# Patient Record
Sex: FEMALE | Race: WHITE | Employment: FULL TIME | URBAN - METROPOLITAN AREA
[De-identification: names, ages, dates, MRNs, and addresses within clinical notes are randomized per-mention and may not be internally consistent; named-entity substitution may affect disease eponyms.]

---

## 2017-02-07 ENCOUNTER — HOSPITAL ENCOUNTER (OUTPATIENT)
Dept: SLEEP CENTER | Facility: HOSPITAL | Age: 62
Discharge: HOME | End: 2017-02-07
Attending: INTERNAL MEDICINE
Payer: COMMERCIAL

## 2017-02-07 DIAGNOSIS — Z99.89 OSA ON CPAP: ICD-10-CM

## 2017-02-07 DIAGNOSIS — G47.33 OSA ON CPAP: ICD-10-CM

## 2017-04-17 ENCOUNTER — TRANSCRIBE ORDERS (OUTPATIENT)
Dept: SLEEP CENTER | Facility: CLINIC | Age: 62
End: 2017-04-17

## 2017-04-17 DIAGNOSIS — G47.33 OSA (OBSTRUCTIVE SLEEP APNEA): Primary | ICD-10-CM

## 2017-05-30 ENCOUNTER — TRANSCRIBE ORDERS (OUTPATIENT)
Dept: SLEEP CENTER | Facility: HOSPITAL | Age: 62
End: 2017-05-30

## 2017-05-30 ENCOUNTER — HOSPITAL ENCOUNTER (OUTPATIENT)
Dept: SLEEP CENTER | Facility: HOSPITAL | Age: 62
Discharge: HOME | End: 2017-05-30
Attending: INTERNAL MEDICINE
Payer: COMMERCIAL

## 2017-05-30 DIAGNOSIS — G47.33 OSA (OBSTRUCTIVE SLEEP APNEA): Primary | ICD-10-CM

## 2017-05-30 DIAGNOSIS — G47.33 OSA (OBSTRUCTIVE SLEEP APNEA): ICD-10-CM

## 2017-07-03 ENCOUNTER — TRANSCRIBE ORDERS (OUTPATIENT)
Dept: SLEEP CENTER | Facility: CLINIC | Age: 62
End: 2017-07-03

## 2017-07-03 DIAGNOSIS — G47.33 OSA (OBSTRUCTIVE SLEEP APNEA): Primary | ICD-10-CM

## 2017-10-03 ENCOUNTER — TRANSCRIBE ORDERS (OUTPATIENT)
Dept: SLEEP CENTER | Facility: HOSPITAL | Age: 62
End: 2017-10-03

## 2017-10-03 ENCOUNTER — HOSPITAL ENCOUNTER (OUTPATIENT)
Dept: SLEEP CENTER | Facility: HOSPITAL | Age: 62
Discharge: HOME | End: 2017-10-03
Attending: INTERNAL MEDICINE
Payer: COMMERCIAL

## 2017-10-03 DIAGNOSIS — G47.33 OSA (OBSTRUCTIVE SLEEP APNEA): Primary | ICD-10-CM

## 2017-10-03 DIAGNOSIS — G47.33 OSA (OBSTRUCTIVE SLEEP APNEA): ICD-10-CM

## 2018-01-16 ENCOUNTER — HOSPITAL ENCOUNTER (OUTPATIENT)
Dept: SLEEP CENTER | Facility: HOSPITAL | Age: 63
Discharge: HOME | End: 2018-01-16
Attending: INTERNAL MEDICINE
Payer: COMMERCIAL

## 2018-01-16 DIAGNOSIS — G47.33 OSA (OBSTRUCTIVE SLEEP APNEA): ICD-10-CM

## 2018-01-16 NOTE — PROGRESS NOTES
Follow-Up Note - Sleep Center   Mariela Chester  61 y o  female  :1955  [id number]    CC: I saw this patient for follow-up in clinic today for her Sleep Disordered Breathing, Coexisting Sleep and Medical Problems  Past medical, Family, Social History, ROS and medications were reviewed  She had recent hospitalization for pulmonary embolism to both her lungs  Herewith my findings in summary  Full Details are available on request      HPI:  With respect to  positive airway pressure therapy  - Compliance data shows:   she is using PAP more than 4 hours hours per night 100%  of the time  and AHI is 4 7/hour at 90th percentile pressure of 19 cm H2O     - She reports:is tolerating PAP   - :is not experiencing difficulties with use; she has minor problem with dry mouth  - :is benefiting from use;          Sleep Routine:  Juvencio Menchaca reports getting 9 hours sleep; she is not  having difficulty initiating or maintainingsleep    She awakens feeling unrefreshed  She admits to much improved excessive drowsiness and She rated herself at 5 /24 on the [place] sleepiness scale  She reports ongoing shortness of breath and chest tightness but states oxygen saturations were apparently normal with use of CPAP during sleep when she was hospitalized  On Exam:  Apart from slight weight increase to 266 lb, Physical findings are essentially unchanged from previous  Impression :   1  Moderate Obstructive Sleep Apnea  2  Hypersomnolence - improved  3  Morbid obesity  4  Shortness of breath  5  Dry mouth  6  Comorbidities:  Hypertension, diabetes, hypothyroidism and pulmonary embolism    Plan:  1  Treatment with  PAP is medically necessary and Juvencio Menchaca is agreable to continue use  She continues to decline a retitration study  2  Pressure setting: [Auto titrating Pap at 16-20 cm H2O     3  Instruction on care of equipment and strategies to improve comfort with use of PAP were discussed   A prescription to replace supplies as needed was provided and care coordinated with the DME provider  4  Need for compliance with therapy and weight reduction were emphasized  5  Follow-up is advised in [1 year or sooner if needed to monitor progress and to adjust therapy  Thank you for allowing me to participate in the care of this patient      Sincerely,    Yeni Jimenez MD  Board Certified Specialist

## 2019-01-07 ENCOUNTER — TRANSCRIBE ORDERS (OUTPATIENT)
Dept: SLEEP CENTER | Facility: HOSPITAL | Age: 64
End: 2019-01-07

## 2019-01-07 DIAGNOSIS — G47.33 OBSTRUCTIVE SLEEP APNEA: Primary | ICD-10-CM

## 2019-01-08 ENCOUNTER — OFFICE VISIT (OUTPATIENT)
Dept: SLEEP CENTER | Facility: HOSPITAL | Age: 64
End: 2019-01-08
Payer: COMMERCIAL

## 2019-01-08 VITALS
DIASTOLIC BLOOD PRESSURE: 74 MMHG | WEIGHT: 254 LBS | HEIGHT: 62 IN | BODY MASS INDEX: 46.74 KG/M2 | HEART RATE: 74 BPM | SYSTOLIC BLOOD PRESSURE: 120 MMHG

## 2019-01-08 DIAGNOSIS — I10 ESSENTIAL HYPERTENSION: ICD-10-CM

## 2019-01-08 DIAGNOSIS — E11.69 DIABETES MELLITUS TYPE 2 IN OBESE (HCC): ICD-10-CM

## 2019-01-08 DIAGNOSIS — G47.33 OBSTRUCTIVE SLEEP APNEA: Primary | ICD-10-CM

## 2019-01-08 DIAGNOSIS — R68.2 DRY MOUTH: ICD-10-CM

## 2019-01-08 DIAGNOSIS — E66.01 MORBID OBESITY WITH BMI OF 40.0-44.9, ADULT (HCC): ICD-10-CM

## 2019-01-08 DIAGNOSIS — E66.9 DIABETES MELLITUS TYPE 2 IN OBESE (HCC): ICD-10-CM

## 2019-01-08 DIAGNOSIS — IMO0002 SLEEP-RELATED HYPOVENTILATION: ICD-10-CM

## 2019-01-08 PROCEDURE — 99214 OFFICE O/P EST MOD 30 MIN: CPT | Performed by: INTERNAL MEDICINE

## 2019-01-08 RX ORDER — LEVOTHYROXINE SODIUM 137 UG/1
137 TABLET ORAL DAILY
COMMUNITY
Start: 2018-11-06

## 2019-01-08 RX ORDER — APIXABAN 5 MG/1
2.5 TABLET, FILM COATED ORAL 2 TIMES DAILY
Refills: 3 | COMMUNITY
Start: 2018-12-28

## 2019-01-08 RX ORDER — LISINOPRIL 10 MG/1
10 TABLET ORAL DAILY
COMMUNITY
Start: 2018-11-06

## 2019-01-08 RX ORDER — SIMVASTATIN 10 MG
10 TABLET ORAL
COMMUNITY
Start: 2018-11-06

## 2019-01-08 NOTE — PROGRESS NOTES
Review of Systems      Genitourinary none   Cardiology none   Gastrointestinal none   Neurology balance problems   Constitutional none   Integumentary none   Psychiatry none   Musculoskeletal none   Pulmonary shortness of breath with activity   ENT none   Endocrine none   Hematological none

## 2019-01-08 NOTE — PROGRESS NOTES
Follow-Up Note - Sleep Center   Juma Stephenson  59 y o  female  :1955  [id number]    CC: I saw this patient for follow-up in clinic today for her Sleep Disordered Breathing, Coexisting Sleep and Medical Problems  She got a mandibular advancement device and is asking if she can discontinue CPAP  PFSH, Problem List, Medications & Allergies were reviewed in EMR  Interval changes: none reported  She  has no past medical history on file  She has a current medication list which includes the following prescription(s): eliquis, levothyroxine, lisinopril, metformin, and simvastatin  ROS: Reviewed (see attached)  She has had approximately 5-10 lb intentional weight loss  Medical conditions are stable  DATA REVIEWED: compliance data download shows  using PAP > 4 hours/night 97% of the time  JEAN CLAUDE (estimated) 3 7/hour at 90th percentile pressure of 19cm H2O  These figures are a bland of dental appliance alone, CPAP alone or at times a combination  SUBJECTIVE: Rosmery Toney reports no  difficulty tolerating PAP;  · She is experiencing some adverse effects: dry mouth and mask causes redness / facial marks   · She is benefitting from use and reports: sleeping better   Sleep Routine: She reports getting 7-8 hours sleep  ; she has no difficulty initiating or maintaining sleep   She awakens spontaneously feeling refreshed  She denied excessive drowsiness   She rated herself at Total score: 2 /24 on the [place] sleepiness scale  Habits: reports that she has quit smoking  She has never used smokeless tobacco ,  reports that she drinks alcohol ,  reports that she does not use drugs  , Caffeine use: limited , Exercise routine: irregular   OBJECTIVE: /74   Pulse 74   Ht 5' 2" (1 575 m)   Wt 115 kg (254 lb)   BMI 46 46 kg/m²    Patient is well groomed; well appearing  Skin/Extrem: warm & dry; col & hydration normal; no edema  Psych: cooperativeand in no distress   Mental state appears normal   CNS: Alert, orientated, clear & coherent speech  H&N: EOMI; NC/AT:no facial pressure marks, no rashes  Neck Circumference: 43 cm  ENMT Mucus membranes normal Nasal airway:patent  Oral airway: crowded  Resp:effort is normal CVS: RRR ABD:truncal obesity MSK:Gait normal     ASSESSMENT: Primary Sleep/Secondary(to Medical or Psych conditions) & comorbidities   1  Obstructive sleep apnea  Ambulatory referral to Sleep Medicine   2  Sleep-related hypoventilation     3  Dry mouth     4  Morbid obesity with BMI of 40 0-44 9, adult ([facility] )     5  Essential hypertension     6  Diabetes mellitus type 2 in obese ([facility] )         PLAN:  1  I reviewed results of the Sleep studies with the patient:  Her diagnostic study in 2010 demonstrated an overall AHI of 11 2 per hour, considerably higher during REM at 38 per hour  Minimum oxygen saturation of 65% and 25 6% of time asleep spent with saturations less than 90%  2  Treatment with  PAP is medically necessary and Gracia Mills is agreable to continue use  3  Care of equipment, methods to improve comfort using PAP and importance of compliance with therapy were discussed  4  Her goal is to get off CPAP and she wanted the pressure to be reduced  Pressure setting:  Change 16-18 cm H2O     5  Rx provided to replace supplies and Care coordinated with DME provider  6  Strategies for weight reduction were discussed  7  She may continue with adjustment of the mandibular advancement device to the point she  Snoring and symptoms are controlled  A repeat diagnostic study to be undertaken at this point to check therapeutic efficacy and to read titrate positive airway pressure therapy as an adjunct if necessary  8  Follow-up is advised in 1 year or sooner if needed to monitor progress, compliance and to adjust therapy  Thank you for allowing me to participate in the care of this patient      Sincerely,    Authenticated electronically by Yonatan Shah MD on 01/08/19   Board Certified Specialist

## 2019-03-11 ENCOUNTER — HOSPITAL ENCOUNTER (OUTPATIENT)
Dept: SLEEP CENTER | Facility: HOSPITAL | Age: 64
Discharge: HOME | End: 2019-03-11
Attending: INTERNAL MEDICINE
Payer: COMMERCIAL

## 2019-03-11 DIAGNOSIS — G47.33 OBSTRUCTIVE SLEEP APNEA: ICD-10-CM

## 2019-03-11 DIAGNOSIS — IMO0002 SLEEP-RELATED HYPOVENTILATION: ICD-10-CM

## 2019-03-11 PROCEDURE — 95811 POLYSOM 6/>YRS CPAP 4/> PARM: CPT

## 2019-03-12 DIAGNOSIS — G47.33 OSA (OBSTRUCTIVE SLEEP APNEA): Primary | ICD-10-CM

## 2019-03-12 NOTE — PROGRESS NOTES
Sleep Study Documentation    Pre-Sleep Study       Sleep testing procedure explained to patient:YES    Patient napped prior to study:NO    Caffeine:Dayshift worker after 12PM   Caffeine use:NO    Alcohol:Dayshift workers after 5PM: Alcohol use:NO    Typical day for patient:YES       Study Documentation    Sleep Study Indications: G47 [id number]    Split Optimal PAP pressure: 6  Leak:Small  Snore:Eliminated  REM Obtained:yes  Supplemental O2: no    Minimum SaO2 82  Baseline SaO2  94  PAP mask tried (list all) Resmed Airfit F10  PAP mask choice (final) Resmed Airfit F10  PAP mask type:full face  PAP pressure at which snoring was eliminated 4  Minimum SaO2 at final PAP pressure  90  Mode of Therapy:CPAP  ETCO2:No  CPAP changed to BiPAP:No    Mode of Therapy:    EKG abnormalities: no     EEG abnormalities: no    Sleep Study Recorded < 2 hours: N/A    Sleep Study Recorded > 2 hours but incomplete study: N/A    Sleep Study Recorded 6 hours but no sleep obtained: NO    Patient classification: retired       Post-Sleep Study    Medication used at bedtime or during sleep study:YES other prescription medications    Patient reports time it took to fall asleep:30 to 60 minutes    Patient reports waking up during study:3 or more times  Patient reports returning to sleep in 10 to 30 minutes  Patient reports sleeping 6 to 8 hours with dreaming  Patient reports sleep during study:worse than usual    Patient rated sleepiness: Not sleepy or tired    PAP treatment:yes: Post PAP treatment patient reports feeling unchanged and would wear PAP mask at home

## 2019-03-13 ENCOUNTER — TELEPHONE (OUTPATIENT)
Dept: SLEEP CENTER | Facility: CLINIC | Age: 64
End: 2019-03-13

## 2019-03-13 NOTE — TELEPHONE ENCOUNTER
Spoke with patient regarding decrease in pressure to 7-10 cm  Pressure will be changed remotely  Rx and data to [address]   Compliance follow up scheduled 5/21 @ 2:45 in [facility]

## 2019-05-17 ENCOUNTER — TRANSCRIBE ORDERS (OUTPATIENT)
Dept: SLEEP CENTER | Facility: HOSPITAL | Age: 64
End: 2019-05-17

## 2019-05-17 DIAGNOSIS — G47.33 OBSTRUCTIVE SLEEP APNEA: Primary | ICD-10-CM

## 2019-05-17 DIAGNOSIS — G47.36 SLEEP RELATED HYPOVENTILATION IN CONDITIONS CLASSIFIED ELSEWHERE: ICD-10-CM

## 2019-07-30 ENCOUNTER — OFFICE VISIT (OUTPATIENT)
Dept: SLEEP CENTER | Facility: HOSPITAL | Age: 64
End: 2019-07-30
Payer: COMMERCIAL

## 2019-07-30 VITALS
WEIGHT: 209 LBS | HEIGHT: 62 IN | DIASTOLIC BLOOD PRESSURE: 66 MMHG | BODY MASS INDEX: 38.46 KG/M2 | SYSTOLIC BLOOD PRESSURE: 114 MMHG | HEART RATE: 72 BPM

## 2019-07-30 DIAGNOSIS — G47.36 SLEEP RELATED HYPOVENTILATION IN CONDITIONS CLASSIFIED ELSEWHERE: ICD-10-CM

## 2019-07-30 DIAGNOSIS — E66.9 DIABETES MELLITUS TYPE 2 IN OBESE (HCC): ICD-10-CM

## 2019-07-30 DIAGNOSIS — E11.69 DIABETES MELLITUS TYPE 2 IN OBESE (HCC): ICD-10-CM

## 2019-07-30 DIAGNOSIS — E66.9 OBESITY (BMI 30-39.9): ICD-10-CM

## 2019-07-30 DIAGNOSIS — G47.33 OBSTRUCTIVE SLEEP APNEA: Primary | ICD-10-CM

## 2019-07-30 DIAGNOSIS — I10 ESSENTIAL HYPERTENSION: ICD-10-CM

## 2019-07-30 DIAGNOSIS — R68.2 DRY MOUTH: ICD-10-CM

## 2019-07-30 PROCEDURE — 99214 OFFICE O/P EST MOD 30 MIN: CPT | Performed by: INTERNAL MEDICINE

## 2019-07-30 PROCEDURE — 3074F SYST BP LT 130 MM HG: CPT | Performed by: INTERNAL MEDICINE

## 2019-07-30 NOTE — PROGRESS NOTES
Follow-Up Note - Sleep Center   Padmini Mayberry  59 y o  female  :1955  [id number]    CC: I saw this patient for follow-up in clinic today for her Sleep Disordered Breathing, Coexisting Sleep and Medical Problems  Patient had a split sleep study and is here to review results and to adjust therapy  The diagnostic portion (undertaken with just the mandibular advancement device) demonstrated: AHI of 6 5 per hour, considerably  higher during stage REM  Intermittent snoring of moderate intensity was noted  Minimum oxygen saturation was 82 % 11 4% of time asleep was spent with saturations less than 90%         During the therapeutic portion of the study (with Pap added to the dental appliance), sleep disordered breathing was partially remediated with nasal CPAP at 7 cm H2O  The AHI at this setting was 7 5 per hour however data is likely skewed because she was observed only 16 minutes while asleep at this setting  Minimum oxygen saturation was maintained above 90%  She was observed during stage REM and while supine  Sleep efficiency improved from 59-79%  PFSH, Problem List, Medications & Allergies were reviewed in EMR  Interval changes: none reported  She  has no past medical history on file  She has a current medication list which includes the following prescription(s): eliquis, levothyroxine, lisinopril, metformin, and simvastatin  ROS: Reviewed (see attached)  She reports intentional weight loss of around 40 lb since her last visit  DATA REVIEWED:  using PAP > 4 hours/night 100% of the time  Estimated JEAN CLAUDE 4 7/hour at pressure of 9 8cm H2O @90th percentile  SUBJECTIVE: Regarding use of PAP, Bettye Peralta reports:   · She is experiencing some adverse effects: dry mouth and mask causes redness / facial marks   · She is   benefiting from use: sleeping better   Sleep Routine: She reports getting 6-7 hrs sleep  ; she has no difficulty initiating or maintaining sleep      She awakens spontaneously and feels refreshed  She denied excessive drowsiness   She rated herself at Total score: 3 /24 on the [place] sleepiness scale  Habits: reports that she has quit smoking  She has never used smokeless tobacco ,  reports that she drinks alcohol ,  reports that she does not use drugs  , Caffeine use: limited , Exercise routine: regular    OBJECTIVE: /66   Pulse 72   Ht 5' 2" (1 575 m)   Wt 94 8 kg (209 lb)   BMI 38 23 kg/m²    Constitutional: Patient is well groomed; well appearing  Skin/Extrem: warm & dry; col & hydration normal; no edema  Psych: cooperativeand in no distress  Normal mood, affect & thought   CNS: Alert, orientated, clear & coherent speech  H&N: EOMI; NC/AT:no facial pressure marks, no rashes  ENMT Mucus membranes normal Nasal airway:patent  Oral airway: crowded  Resp:effort is normal CVS: RRR ABD:truncal obesity MSK:Gait normal     ASSESSMENT: Primary Sleep/Secondary(to Medical or Psych conditions) & comorbidities   1  Obstructive sleep apnea  Ambulatory referral to Sleep Medicine    PAP DME Resupply/Reorder   2  Sleep related hypoventilation in conditions classified elsewhere  Ambulatory referral to Sleep Medicine   3  Dry mouth     4  Obesity (BMI 30-39 9)     5  Essential hypertension     6  Diabetes mellitus type 2 in obese ([facility] [address] )       PLAN:  1  Treatment with  PAP is medically necessary and Franny Velarde is agreable to continue use  2  Care of equipment, methods to improve comfort using PAP and importance of compliance with therapy were discussed  3  Pressure setting: continue 7-10 cmH2O together with mandibular advancement device  4  Rx provided to replace supplies and Care coordinated with DME provider  5  Strategies for weight reduction were discussed  6  Follow-up is advised in 1 year or sooner if needed to monitor progress, compliance and to adjust therapy        Thank you for allowing me to participate in the care of this patient      Sincerely,    Authenticated electronically by Chapin Doyle MD on 10/10/71   Board Certified Specialist

## 2019-07-30 NOTE — PROGRESS NOTES
Review of Systems      Genitourinary none   Cardiology none   Gastrointestinal none   Neurology need to move extremities and balance problems   Constitutional weight change   Integumentary none   Psychiatry none   Musculoskeletal none   Pulmonary snoring   ENT none   Endocrine none   Hematological none

## 2020-02-27 ENCOUNTER — TELEPHONE (OUTPATIENT)
Dept: SLEEP CENTER | Facility: CLINIC | Age: 65
End: 2020-02-27

## 2020-02-27 NOTE — TELEPHONE ENCOUNTER
Patient left message that [employer] is requesting office notes be faxed so that patient can receive resupplies   Office notes from 7/30/19 visit faxed to [facility]

## 2020-03-02 NOTE — TELEPHONE ENCOUNTER
Patient called and reports that she can't get supplies from [employer]  Chart notes were faxed to [employer] on 2/27/2020  Carolyn Whitmore from [facility] reports that ce has clinical notes   He will have resupply contact her today     Returned the patient call advised her that someone from [employer] would reach out today for supplies

## 2020-03-17 ENCOUNTER — OFFICE VISIT (OUTPATIENT)
Dept: SLEEP CENTER | Facility: HOSPITAL | Age: 65
End: 2020-03-17
Payer: MEDICARE

## 2020-03-17 VITALS
SYSTOLIC BLOOD PRESSURE: 110 MMHG | HEIGHT: 62 IN | WEIGHT: 222 LBS | HEART RATE: 77 BPM | BODY MASS INDEX: 40.85 KG/M2 | DIASTOLIC BLOOD PRESSURE: 68 MMHG

## 2020-03-17 DIAGNOSIS — E66.01 MORBID OBESITY WITH BMI OF 40.0-44.9, ADULT (HCC): ICD-10-CM

## 2020-03-17 DIAGNOSIS — E11.69 DIABETES MELLITUS TYPE 2 IN OBESE (HCC): ICD-10-CM

## 2020-03-17 DIAGNOSIS — G47.36 SLEEP RELATED HYPOVENTILATION IN CONDITIONS CLASSIFIED ELSEWHERE: ICD-10-CM

## 2020-03-17 DIAGNOSIS — E66.9 OBESITY (BMI 30-39.9): ICD-10-CM

## 2020-03-17 DIAGNOSIS — G47.33 OBSTRUCTIVE SLEEP APNEA: Primary | ICD-10-CM

## 2020-03-17 DIAGNOSIS — E66.9 DIABETES MELLITUS TYPE 2 IN OBESE (HCC): ICD-10-CM

## 2020-03-17 DIAGNOSIS — I10 ESSENTIAL HYPERTENSION: ICD-10-CM

## 2020-03-17 PROCEDURE — 99214 OFFICE O/P EST MOD 30 MIN: CPT | Performed by: INTERNAL MEDICINE

## 2020-03-17 NOTE — PROGRESS NOTES
Review of Systems      Genitourinary none   Cardiology none   Gastrointestinal none   Neurology numbness/tingling of an extremity and balance problems   Constitutional none   Integumentary none   Psychiatry none   Musculoskeletal none   Pulmonary snoring   ENT throat clearing   Endocrine none   Hematological none

## 2020-03-17 NOTE — PROGRESS NOTES
Follow-Up Note - Sleep Center   Adin Olguin  72 y o  female  :1955  [id number]:[id number]    CC: I saw this patient for follow-up in clinic today for her Sleep Disordered Breathing, Coexisting Sleep and Medical Problems  She comes in before her schedule this it because recently switched to Medicare PFSH, Problem List, Medications & Allergies were reviewed in EMR  Interval changes: none reported  She  has no past medical history on file  She has a current medication list which includes the following prescription(s): eliquis, levothyroxine, lisinopril, metformin, and simvastatin  ROS: A 10 point review of systems undertaken (as attached)  Significant for approximately 20 lb weight gain  DATA REVIEWED:  using PAP > 4 hours/night 93% of the time  Estimated JEAN CLAUDE >5/hour episodic at pressure of 10cm H2O @90th percentile  SUBJECTIVE: Regarding use of PAP, Guerline Cuellar reports:   · She is experiencing some adverse effects: mask causes redness / facial marks   · She is   benefiting from use: sleeping better   Sleep Routine: She reports getting 8-9 hrs sleep  ; she has no difficulty initiating or maintaining sleep   She awakens spontaneously and feels refreshed  She denied excessive drowsiness   She rated herself at Total score: 4 /24 on the [place] sleepiness scale  Habits: reports that she quit smoking about 22 years ago  She has quit using smokeless tobacco ,  reports that she drinks alcohol ,  reports that she does not use drugs  , Caffeine use: limited , Exercise routine: regular    OBJECTIVE: /68   Pulse 77   Ht 5' 2" (1 575 m)   Wt 101 kg (222 lb)   BMI 40 60 kg/m²    Constitutional: Patient is well groomed; well appearing  Skin/Extrem: warm & dry; col & hydration normal; no edema  Psych: cooperativeand in no distress  Mental State appears normal   CNS: Alert, orientated, clear & coherent speech  H&N: EOMI; NC/AT:no facial pressure marks, no rashes     ENMT Mucus membranes normal Nasal airway:patent  Oral airway: crowded  Resp:effort is normal CVS: RRR ABD:truncal obesity MSK:Gait normal     ASSESSMENT: Primary Sleep/Secondary(to Medical or Psych conditions) & comorbidities   1  Obstructive sleep apnea  PAP DME Resupply/Reorder    PAP DME Pressure Change   2  Sleep related hypoventilation in conditions classified elsewhere     3  Obesity (BMI 30-39 9)     4  Essential hypertension     5  Diabetes mellitus type 2 in obese ([facility] [address] )       PLAN:  1  Treatment with  PAP is medically necessary and [facility] is agreable to continue use  2  Care of equipment, methods to improve comfort using PAP and importance of compliance with therapy were discussed  3  Pressure setting: change 9-12 cmH2O     4  Rx provided to replace supplies and Care coordinated with DME provider  5  Strategies for weight reduction were discussed  6  Follow-up is advised in 1 year or sooner if needed to monitor progress, compliance and to adjust therapy  Thank you for allowing me to participate in the care of this patient      Sincerely,    Authenticated electronically by Alma Rosa Naylor MD on 29/48/07   Board Certified Specialist

## 2020-03-17 NOTE — PATIENT INSTRUCTIONS

## 2020-03-18 ENCOUNTER — TELEPHONE (OUTPATIENT)
Dept: SLEEP CENTER | Facility: CLINIC | Age: 65
End: 2020-03-18

## 2020-03-23 ENCOUNTER — TELEPHONE (OUTPATIENT)
Dept: SLEEP CENTER | Facility: CLINIC | Age: 65
End: 2020-03-23

## 2021-01-15 ENCOUNTER — TELEPHONE (OUTPATIENT)
Dept: SLEEP CENTER | Facility: CLINIC | Age: 66
End: 2021-01-15

## 2021-01-15 NOTE — TELEPHONE ENCOUNTER
Patient called she is havint trouble sleeping and she is concerned about her fit bit reading   Changed her appointment to a sooner date

## 2021-02-01 NOTE — PROGRESS NOTES
Review of Systems      Genitourinary none   Cardiology none   Gastrointestinal none   Neurology none   Constitutional none   Integumentary none   Psychiatry none   Musculoskeletal back pain   Pulmonary wheezing and frequent cough   ENT throat clearing   Endocrine none   Hematological none

## 2021-02-02 ENCOUNTER — TELEMEDICINE (OUTPATIENT)
Dept: SLEEP CENTER | Facility: HOSPITAL | Age: 66
End: 2021-02-02
Payer: MEDICARE

## 2021-02-02 DIAGNOSIS — G47.33 OBSTRUCTIVE SLEEP APNEA: Primary | ICD-10-CM

## 2021-02-02 DIAGNOSIS — E66.01 MORBID OBESITY WITH BMI OF 40.0-44.9, ADULT (HCC): ICD-10-CM

## 2021-02-02 DIAGNOSIS — E66.9 DIABETES MELLITUS TYPE 2 IN OBESE (HCC): ICD-10-CM

## 2021-02-02 DIAGNOSIS — I10 ESSENTIAL HYPERTENSION: ICD-10-CM

## 2021-02-02 DIAGNOSIS — G47.36 SLEEP RELATED HYPOVENTILATION IN CONDITIONS CLASSIFIED ELSEWHERE: ICD-10-CM

## 2021-02-02 DIAGNOSIS — R68.2 DRY MOUTH: ICD-10-CM

## 2021-02-02 DIAGNOSIS — E11.69 DIABETES MELLITUS TYPE 2 IN OBESE (HCC): ICD-10-CM

## 2021-02-02 PROCEDURE — 99214 OFFICE O/P EST MOD 30 MIN: CPT | Performed by: INTERNAL MEDICINE

## 2021-02-02 NOTE — PROGRESS NOTES
Virtual Regular Visit      Assessment/Plan:    Problem List Items Addressed This Visit        Digestive    Dry mouth       Endocrine    Diabetes mellitus type 2 in obese (HCC)       Respiratory    Obstructive sleep apnea - Primary       Cardiovascular and Mediastinum    Essential hypertension       Other    Morbid obesity with BMI of 40 0-44 9, adult ([facility] [address] )      Other Visit Diagnoses     Sleep related hypoventilation in conditions classified elsewhere                 Reason for visit is   Chief Complaint   Patient presents with    Virtual Regular Visit        Encounter provider Alma Rosa Naylor MD    Provider located at [place] [address] [address] [place] [address]      Recent Visits  No visits were found meeting these conditions  Showing recent visits within past 7 days and meeting all other requirements     Today's Visits  Date Type Provider Dept   21 Roni Galvez MD  [facility] [place]   Showing today's visits and meeting all other requirements     Future Appointments  No visits were found meeting these conditions  Showing future appointments within next 150 days and meeting all other requirements        The patient was identified by name and date of birth  Emmie Salas was informed that this is a telemedicine visit and that the visit is being conducted through [facility] and patient was informed that this is a secure, HIPAA-compliant platform  She agrees to proceed     My office door was closed  No one else was in the room  She acknowledged consent and understanding of privacy and security of the video platform  The patient has agreed to participate and understands they can discontinue the visit at any time  Patient is aware this is a billable service  Follow-Up Note - Sleep Center   Emmie Salas  77 y o  female  :1955  LNP:[id number]    CC:  I saw this patient for follow-up in clinic today for Sleep disordered breathing, Coexisting Sleep and Medical Problems  She had a recent exacerbation of asthma for which she was started on inhalers  Her symptoms have improved but she is extremely concerned about how oxygen levels during sleep  She sent an image from her fit bit that I reviewed  Results of a split study in 2019:  The diagnostic portion (undertaken with just the mandibular advancement device) demonstrated: AHI of 6 5 per hour, considerably  higher during stage REM  Intermittent snoring of moderate intensity was noted  Minimum oxygen saturation was 82 % 11 4% of time asleep was spent with saturations less than 90%          During the therapeutic portion of the study (with Pap added to the dental appliance), sleep disordered breathing was partially remediated with nasal CPAP at 7 cm H2O  The AHI at this setting was 7 5 per hour however data is likely skewed because she was observed only 16 minutes while asleep at this setting  Minimum oxygen saturation was maintained above 90%  She was observed during stage REM and while supine  Sleep efficiency improved from 59-79%      PFSH, Problem List, Medications & Allergies were reviewed in EMR  Interval changes: none reported  She  has no past medical history on file  She has a current medication list which includes the following prescription(s): eliquis, levothyroxine, lisinopril, metformin, and simvastatin  ROS: as attached  Significant for some weight gain since her last visit  She reports no nasal symptoms but frequently needs to clear her throat  She continues to report some coughing and wheezing     PHYSIOLOGICAL DATA REVIEW AND INTERPRETATION:   using PAP > 4 hours/night 100% of the time  Estimated JEAN CLAUDE 2 7/hour at pressure of 11 6cm H2O @90th percentile     Compliance: excellent; Sleep disordered breathing:stable    SUBJECTIVE: Regarding use of PAP, Mica Mom reports:   · She is experiencing minor adverse effects: mask leaks   · She is benefiting from use: sleeping better   Sleep Routine: She reports getting 7-8 hrs sleep  ; she has no difficulty initiating or maintaining sleep   She awakens spontaneously and feels refreshed  Excessive Daytime drowsiness denied    She  rated herself at Total score: 3 /24 on the [place] sleepiness scale ]         Habits: reports that she quit smoking about 23 years ago  She has quit using smokeless tobacco ,  reports current alcohol use ,  reports no history of drug use , Caffeine use: limited , Exercise routine: irregular   EXAM: There were no vitals taken for this visit  Patient is well groomed; well appearing  Skin/Extrem: col & hydration normal; no edema  Psych: cooperativeand in no distress  Mental state:appears normal   CNS: Alert, orientated, clear & coherent speech  H&N: EOMI; NC/AT:no facial pressure marks, no rashes  Respiratory effort appears normal    IMPRESSION: Primary Sleep/Secondary(to Medical or Psych conditions) & comorbidities   1  Obstructive sleep apnea     2  Sleep related hypoventilation in conditions classified elsewhere     3  Dry mouth     4  Essential hypertension     5  Morbid obesity with BMI of 40 0-44 9, adult ([facility] )     6  Diabetes mellitus type 2 in obese ([facility] )         PLAN:  1  I reviewed results of prior studies and physiologic data with the patient  I discussed treatment options with risks and benefits  2  I informed her off limitations of wearable devices, particularly since they have not been validated  She is not experiencing any daytime symptoms that would suggest her sleep disordered breathing is unstable  Not withstanding, she remains concerned and wants "to be sure my oxygen is okay and I do not stops breathing during sleep " To be certain, the options would be nocturnal pulse oximetry, home sleep testing using peripheral arterial tonometry or a repeat in-lab titration study      3  Treatment with  PAP is medically necessary and Claudetta Ganong is agreable to continue use  4  Care of equipment, methods to improve comfort using PAP and importance of compliance with therapy were discussed  5  Pressure setting: continue 9-12 cmH2O     6  Rx provided to replace supplies and Care coordinated with DME provider  7  Discussed  strategies for weight reduction  8  She is under care for medical conditions and asthma  9  She wanted to undertake Home sleep testing using peripheral arterial tone especially since this study will monitor during sleep for 2-3 nights  10  Follow-up is advised after the study to review results and to  and to adjust therapy  Thank you for allowing me to participate in the care of this patient  Sincerely,    Authenticated electronically by Shell Gold MD on 61/01/02   Board Certified Specialist     I spent 20 minutes directly with the patient during this visit      VIRTUAL VISIT DISCLAIMER    Harshaalejandro Sabrina acknowledges that she has consented to an online visit or consultation  She understands that the online visit is based solely on information provided by her, and that, in the absence of a face-to-face physical evaluation by the physician, the diagnosis she receives is both limited and provisional in terms of accuracy and completeness  This is not intended to replace a full medical face-to-face evaluation by the physician  Jose F Bennett understands and accepts these terms

## 2021-02-03 ENCOUNTER — TELEPHONE (OUTPATIENT)
Dept: SLEEP CENTER | Facility: CLINIC | Age: 66
End: 2021-02-03

## 2021-02-15 ENCOUNTER — TELEPHONE (OUTPATIENT)
Dept: SLEEP CENTER | Facility: CLINIC | Age: 66
End: 2021-02-15

## 2021-02-15 NOTE — TELEPHONE ENCOUNTER
Patient left voicemail message that Dr Kathy Gaviria said he was going to order an overnight pulse oximetry test    She is asking if one is going to be ordered     If no she wants an explanation please

## 2021-02-26 NOTE — TELEPHONE ENCOUNTER
Per Dr Mayank Keith, pulse oximetry device is being mailed to patient through his private office  Called patient to confirm if she received this device  Patient states she did not receive yet but she called Dr Larissa Fragoso office [phone] and was told that it was mailed  Patient states she will wait a few more days and if she does not receive it she will call Dr Larissa Fragoso private office

## 2021-03-04 DIAGNOSIS — Z23 ENCOUNTER FOR IMMUNIZATION: ICD-10-CM

## 2021-03-09 ENCOUNTER — IMMUNIZATIONS (OUTPATIENT)
Dept: FAMILY MEDICINE CLINIC | Facility: HOSPITAL | Age: 66
End: 2021-03-09

## 2021-03-09 DIAGNOSIS — Z23 ENCOUNTER FOR IMMUNIZATION: Primary | ICD-10-CM

## 2021-03-09 PROCEDURE — 91300 SARS-COV-2 / COVID-19 MRNA VACCINE (PFIZER-BIONTECH) 30 MCG: CPT

## 2021-03-09 PROCEDURE — 0001A SARS-COV-2 / COVID-19 MRNA VACCINE (PFIZER-BIONTECH) 30 MCG: CPT

## 2021-03-29 ENCOUNTER — IMMUNIZATIONS (OUTPATIENT)
Dept: FAMILY MEDICINE CLINIC | Facility: HOSPITAL | Age: 66
End: 2021-03-29

## 2021-03-29 DIAGNOSIS — Z23 ENCOUNTER FOR IMMUNIZATION: Primary | ICD-10-CM

## 2021-03-29 PROCEDURE — 91300 SARS-COV-2 / COVID-19 MRNA VACCINE (PFIZER-BIONTECH) 30 MCG: CPT

## 2021-03-29 PROCEDURE — 0002A SARS-COV-2 / COVID-19 MRNA VACCINE (PFIZER-BIONTECH) 30 MCG: CPT

## 2021-06-24 ENCOUNTER — TELEPHONE (OUTPATIENT)
Dept: SLEEP CENTER | Facility: CLINIC | Age: 66
End: 2021-06-24

## 2021-06-24 NOTE — TELEPHONE ENCOUNTER
Patient called about PAP recall    I advised [facility] recommendations:    Continuous Positive Airway Pressure (CPAP) therapy was prescribed to you as a medical necessity and there are risks of discontinuing  use of the device, some of which may be long term  Symptoms you experienced before using CPAP may return such as snoring, apneas, excessive daytime sleepiness, hypertension, cardiac arrhythmias, risk of stroke, congestive heart-failure, exacerbation of COPD and potential respiratory failure  Ultimately, it is a personal decision for you to make if you continue use of an affected device or discontinue until a replacement is provided  Unfortunately, [employer] has not yet provided us with information about available devices  You can visit their website at www  [employer]/scr-update to register your device and to learn more about how [employer] plans to replace your device  Patient has been using old machine, but feels that her current machine is about 9years old, I advised she should reach out to [facility] to see if she is eligible and if she is call back and we can have Dr Remington Blankenship write new order  Patient agreeable

## 2021-06-28 ENCOUNTER — OFFICE VISIT (OUTPATIENT)
Dept: GASTROENTEROLOGY | Facility: CLINIC | Age: 66
End: 2021-06-28
Payer: MEDICARE

## 2021-06-28 ENCOUNTER — TELEPHONE (OUTPATIENT)
Dept: GASTROENTEROLOGY | Facility: CLINIC | Age: 66
End: 2021-06-28

## 2021-06-28 VITALS
WEIGHT: 242 LBS | SYSTOLIC BLOOD PRESSURE: 118 MMHG | HEIGHT: 62 IN | DIASTOLIC BLOOD PRESSURE: 76 MMHG | HEART RATE: 72 BPM | BODY MASS INDEX: 44.53 KG/M2

## 2021-06-28 DIAGNOSIS — I82.4Z9 ACUTE DEEP VEIN THROMBOSIS (DVT) OF DISTAL VEIN OF LOWER EXTREMITY, UNSPECIFIED LATERALITY (HCC): ICD-10-CM

## 2021-06-28 DIAGNOSIS — Z86.010 HISTORY OF COLON POLYPS: Primary | ICD-10-CM

## 2021-06-28 DIAGNOSIS — Z12.11 SCREENING FOR COLON CANCER: ICD-10-CM

## 2021-06-28 DIAGNOSIS — Z79.01 CURRENT USE OF LONG TERM ANTICOAGULATION: ICD-10-CM

## 2021-06-28 DIAGNOSIS — I26.99 OTHER ACUTE PULMONARY EMBOLISM WITHOUT ACUTE COR PULMONALE (HCC): ICD-10-CM

## 2021-06-28 PROBLEM — Z86.0100 HISTORY OF COLON POLYPS: Status: ACTIVE | Noted: 2021-06-28

## 2021-06-28 PROBLEM — R42 VERTIGO: Status: ACTIVE | Noted: 2021-06-28

## 2021-06-28 PROCEDURE — 99204 OFFICE O/P NEW MOD 45 MIN: CPT | Performed by: NURSE PRACTITIONER

## 2021-06-28 NOTE — H&P (VIEW-ONLY)
[facility] - Outpatient Consultation  Alana Rivero 77 y o  female MRN: [id number]  Encounter: [id number]    ASSESSMENT AND PLAN:      1  History of colon polyps  Remote history of colon polyps on colonoscopy  Last colonoscopy 07/2016 was normal, 5 year recall  No recent alarm symptoms  [facility]  -clear liquid diet and bowel prep reviewed with patient    2  Screening for colon cancer  Last colonoscopy 07/2016 with 5 year recall due to prior history of colon polyp      3  Acute deep vein thrombosis (DVT) of distal vein of lower extremity, unspecified laterality (HCC)  4  Other acute pulmonary embolism without acute cor pulmonale (HCC)  History of lower extremity DVT and pulmonary emboli in 2018  No further thromboembolic events since then  Lifelong anticoagulation recommended and patient follows with Dr Jesica Vazquez, Hematology, [facility]  -will confer with Dr Jesica Vazquez regarding interruption of anticoagulation to ensure no further instructions or recommendations need    5  Current use of long term anticoagulation  Patient currently taking Eliquis 5 mg b i d  for anticoagulation    -instructed to hold Eliquis 2 days prior to colonoscopy  -discussed risks of thromboembolic event with stopping Eliquis  Patient understands and accepts this risk  6  Vertigo   Patient has history of benign positional vertigo which is exacerbated when she lies on her left side  She is requesting colonoscopy be performed with her lying on her back if possible and to minimize time positioned on her left side  Followup Appointment:  As needed  ______________________________________________________________________    Chief Complaint   Patient presents with    Clearance for colonoscopy  Pt is on Eliquis       HPI:   Alana Rivero is a 77y o  year old female who presents to schedule screening colonoscopy    She has a prior history of colon polyp on colonoscopy approximately 10 years ago  Her most recent colonoscopy was 2016 and was normal, no polyps and medium grade 2 hemorrhoids noted  Five year recall recommended and patient presents today to schedule her procedure  She denies recent change in bowel habits  She does admit to occasional constipation and straining with bowel movements  Denies diarrhea, reports formed bowel movement every 1-2 days  No melena or rectal bleeding  No recent unintentional weight loss    She has a history of lower extremity DVT with pulmonary emboli in 2018  She is currently on Eliquis 5 mg b i d  She has had no further thromboembolic events since  and follows with Dr Nathan Laws [facility]ology -On[facility] for management of her anticoagulation  She tells me she interrupted her anticoagulation recently for an ENT procedure and had no problems or issues  Historical Information   Past Medical History:   Diagnosis Date    Asthma     Colon polyp     Sleep apnea     compliant with CPAP nightly     Past Surgical History:   Procedure Laterality Date    COLONOSCOPY       Social History     Substance and Sexual Activity   Alcohol Use Yes    Comment: very rarely     Social History     Substance and Sexual Activity   Drug Use No     Social History     Tobacco Use   Smoking Status Former Smoker    Quit date: 80    Years since quittin 5   Smokeless Tobacco Former User     Family History   Problem Relation Age of Onset    Multiple myeloma Mother     Colon polyps Neg Hx     Colon cancer Neg Hx        Meds/Allergies     Current Outpatient Medications:     ELIQUIS 5 MG    levothyroxine 137 mcg tablet    lisinopril (ZESTRIL) 10 mg tablet    metFORMIN (GLUCOPHAGE) 500 mg tablet    simvastatin (ZOCOR) 10 mg tablet    No Known Allergies    PHYSICAL EXAM:    Blood pressure 118/76, pulse 72, height 5' 2" (1 575 m), weight 110 kg (242 lb)  Body mass index is 44 26 kg/m²    General Appearance: NAD, cooperative, alert  Eyes: Anicteric, PERRLA, EOMI  ENT:  Normocephalic, atraumatic, normal mucosa  Class 2 Mallampati airway classification  Neck:  Supple, symmetrical, trachea midline,   Resp:  Clear to auscultation bilaterally; no rales, rhonchi or wheezing; respirations unlabored   CV:  S1 S2, Regular rate and rhythm; no murmur, rub, or gallop  GI:  Soft, non-tender, non-distended; normal bowel sounds; no masses, no organomegaly   Rectal: Deferred  Musculoskeletal: No cyanosis, clubbing or edema  Normal ROM  Skin:  No jaundice, rashes, or lesion  Psych: Normal affect, good eye contact  Neuro: No gross deficits, AAOx3          REVIEW OF SYSTEMS:    CONSTITUTIONAL: Denies any fever, chills, rigors, and weight loss  HEENT: No earache or tinnitus  Denies hearing loss or visual disturbances  CARDIOVASCULAR: No chest pain or palpitations  RESPIRATORY: Denies any cough, hemoptysis, shortness of breath or dyspnea on exertion  GASTROINTESTINAL: As noted in the History of Present Illness  GENITOURINARY: No problems with urination  Denies any hematuria or dysuria  NEUROLOGIC: No dizziness or vertigo, denies headaches  MUSCULOSKELETAL: Denies any muscle or joint pain  SKIN: Denies skin rashes or itching  ENDOCRINE: Denies excessive thirst  Denies intolerance to heat or cold  PSYCHOSOCIAL: Denies depression or anxiety  Denies any recent memory loss

## 2021-06-28 NOTE — PROGRESS NOTES
[facility] - Outpatient Consultation  Adeola Figueroa 77 y o  female MRN: [id number]  Encounter: [id number]    ASSESSMENT AND PLAN:      1  History of colon polyps  Remote history of colon polyps on colonoscopy  Last colonoscopy 07/2016 was normal, 5 year recall  No recent alarm symptoms  [facility]  -clear liquid diet and bowel prep reviewed with patient    2  Screening for colon cancer  Last colonoscopy 07/2016 with 5 year recall due to prior history of colon polyp      3  Acute deep vein thrombosis (DVT) of distal vein of lower extremity, unspecified laterality (HCC)  4  Other acute pulmonary embolism without acute cor pulmonale (HCC)  History of lower extremity DVT and pulmonary emboli in 2018  No further thromboembolic events since then  Lifelong anticoagulation recommended and patient follows with Dr Shanell Sherman, Hematology, [facility]  -will confer with Dr Shanell Sherman regarding interruption of anticoagulation to ensure no further instructions or recommendations need    5  Current use of long term anticoagulation  Patient currently taking Eliquis 5 mg b i d  for anticoagulation    -instructed to hold Eliquis 2 days prior to colonoscopy  -discussed risks of thromboembolic event with stopping Eliquis  Patient understands and accepts this risk  6  Vertigo   Patient has history of benign positional vertigo which is exacerbated when she lies on her left side  She is requesting colonoscopy be performed with her lying on her back if possible and to minimize time positioned on her left side  Followup Appointment:  As needed  ______________________________________________________________________    Chief Complaint   Patient presents with    Clearance for colonoscopy  Pt is on Eliquis       HPI:   Adeola Figueroa is a 77y o  year old female who presents to schedule screening colonoscopy    She has a prior history of colon polyp on colonoscopy approximately 10 years ago  Her most recent colonoscopy was 2016 and was normal, no polyps and medium grade 2 hemorrhoids noted  Five year recall recommended and patient presents today to schedule her procedure  She denies recent change in bowel habits  She does admit to occasional constipation and straining with bowel movements  Denies diarrhea, reports formed bowel movement every 1-2 days  No melena or rectal bleeding  No recent unintentional weight loss    She has a history of lower extremity DVT with pulmonary emboli in 2018  She is currently on Eliquis 5 mg b i d  She has had no further thromboembolic events since  and follows with Dr John Romero Hematology -Oncology for management of her anticoagulation  She tells me she interrupted her anticoagulation recently for an ENT procedure and had no problems or issues  Historical Information   Past Medical History:   Diagnosis Date    Asthma     Colon polyp     Sleep apnea     compliant with CPAP nightly     Past Surgical History:   Procedure Laterality Date    COLONOSCOPY       Social History     Substance and Sexual Activity   Alcohol Use Yes    Comment: very rarely     Social History     Substance and Sexual Activity   Drug Use No     Social History     Tobacco Use   Smoking Status Former Smoker    Quit date: 80    Years since quittin 5   Smokeless Tobacco Former User     Family History   Problem Relation Age of Onset    Multiple myeloma Mother     Colon polyps Neg Hx     Colon cancer Neg Hx        Meds/Allergies     Current Outpatient Medications:     ELIQUIS 5 MG    levothyroxine 137 mcg tablet    lisinopril (ZESTRIL) 10 mg tablet    metFORMIN (GLUCOPHAGE) 500 mg tablet    simvastatin (ZOCOR) 10 mg tablet    No Known Allergies    PHYSICAL EXAM:    Blood pressure 118/76, pulse 72, height 5' 2" (1 575 m), weight 110 kg (242 lb)  Body mass index is 44 26 kg/m²    General Appearance: NAD, cooperative, alert  Eyes: Anicteric, PERRLA, EOMI  ENT:  Normocephalic, atraumatic, normal mucosa  Class 2 Mallampati airway classification  Neck:  Supple, symmetrical, trachea midline,   Resp:  Clear to auscultation bilaterally; no rales, rhonchi or wheezing; respirations unlabored   CV:  S1 S2, Regular rate and rhythm; no murmur, rub, or gallop  GI:  Soft, non-tender, non-distended; normal bowel sounds; no masses, no organomegaly   Rectal: Deferred  Musculoskeletal: No cyanosis, clubbing or edema  Normal ROM  Skin:  No jaundice, rashes, or lesion  Psych: Normal affect, good eye contact  Neuro: No gross deficits, AAOx3          REVIEW OF SYSTEMS:    CONSTITUTIONAL: Denies any fever, chills, rigors, and weight loss  HEENT: No earache or tinnitus  Denies hearing loss or visual disturbances  CARDIOVASCULAR: No chest pain or palpitations  RESPIRATORY: Denies any cough, hemoptysis, shortness of breath or dyspnea on exertion  GASTROINTESTINAL: As noted in the History of Present Illness  GENITOURINARY: No problems with urination  Denies any hematuria or dysuria  NEUROLOGIC: No dizziness or vertigo, denies headaches  MUSCULOSKELETAL: Denies any muscle or joint pain  SKIN: Denies skin rashes or itching  ENDOCRINE: Denies excessive thirst  Denies intolerance to heat or cold  PSYCHOSOCIAL: Denies depression or anxiety  Denies any recent memory loss

## 2021-06-28 NOTE — LETTER
June 28, 2021     Parviz Kyle, [address]    Patient: Kat Montelongo   YOB: 1955   Date of Visit: 6/28/2021       Dear Dr Lyssa Alarcon: Thank you for referring Kat Montelongo to me for evaluation  Below are my notes for this consultation  If you have questions, please do not hesitate to call me  I look forward to following your patient along with you  Sincerely,        KAREN Chung        CC: No Recipients  KAREN Chung  6/28/2021  2:02 PM  Sign when Signing Visit    [facility] - Outpatient Consultation  Kat Montelongo 77 y o  female MRN: [id number]  Encounter: [id number]    ASSESSMENT AND PLAN:      1  History of colon polyps  Remote history of colon polyps on colonoscopy  Last colonoscopy 07/2016 was normal, 5 year recall  No recent alarm symptoms  -scheduled for surveillance colonoscopy at [facility]  -clear liquid diet and bowel prep reviewed with patient    2  Screening for colon cancer  Last colonoscopy 07/2016 with 5 year recall due to prior history of colon polyp      3  Acute deep vein thrombosis (DVT) of distal vein of lower extremity, unspecified laterality (HCC)  4  Other acute pulmonary embolism without acute cor pulmonale (HCC)  History of lower extremity DVT and pulmonary emboli in 2018  No further thromboembolic events since then  Lifelong anticoagulation recommended and patient follows with Dr Fabiano Pena, Hematology, [facility]  -will confer with Dr Fabiano Pena regarding interruption of anticoagulation to ensure no further instructions or recommendations need    5  Current use of long term anticoagulation  Patient currently taking Eliquis 5 mg b i d  for anticoagulation  -instructed to hold Eliquis 2 days prior to colonoscopy  -discuss risks of thromboembolic event with stopping Eliquis  Patient understands and accepts this risk        Followup Appointment:  As needed  ______________________________________________________________________    Chief Complaint   Patient presents with    Clearance for colonoscopy  Pt is on Eliquis       HPI:   Delvis Landa is a 77y o  year old female who presents to schedule screening colonoscopy  She has a prior history of colon polyp on colonoscopy approximately 10 years ago  Her most recent colonoscopy was 2016 and was normal, no polyps and medium grade 2 hemorrhoids noted  Five year recall recommended and patient presents today to schedule her procedure  She denies recent change in bowel habits  She does admit to occasional constipation and straining with bowel movements  Denies diarrhea, reports formed bowel movement every 1-2 days  No melena or rectal bleeding  No recent unintentional weight loss    She has a history of lower extremity DVT with pulmonary emboli in   She is currently on Eliquis 5 mg b i d  She has had no further thromboembolic events since  and follows with Dr Katlyn Jerome H[facility]ology -On[facility] for management of her anticoagulation  She tells me she interrupted her anticoagulation recently for an ENT procedure and had no problems or issues      Historical Information   Past Medical History:   Diagnosis Date    Asthma     Colon polyp     Sleep apnea     compliant with CPAP nightly     Past Surgical History:   Procedure Laterality Date    COLONOSCOPY       Social History     Substance and Sexual Activity   Alcohol Use Yes    Comment: very rarely     Social History     Substance and Sexual Activity   Drug Use No     Social History     Tobacco Use   Smoking Status Former Smoker    Quit date: 80    Years since quittin 5   Smokeless Tobacco Former User     Family History   Problem Relation Age of Onset    Multiple myeloma Mother     Colon polyps Neg Hx     Colon cancer Neg Hx        Meds/Allergies     Current Outpatient Medications:     ELIQUIS 5 MG   levothyroxine 137 mcg tablet    lisinopril (ZESTRIL) 10 mg tablet    metFORMIN (GLUCOPHAGE) 500 mg tablet    simvastatin (ZOCOR) 10 mg tablet    No Known Allergies    PHYSICAL EXAM:    Blood pressure 118/76, pulse 72, height 5' 2" (1 575 m), weight 110 kg (242 lb)  Body mass index is 44 26 kg/m²  General Appearance: NAD, cooperative, alert  Eyes: Anicteric, PERRLA, EOMI  ENT:  Normocephalic, atraumatic, normal mucosa  Class 2 Mallampati airway classification  Neck:  Supple, symmetrical, trachea midline,   Resp:  Clear to auscultation bilaterally; no rales, rhonchi or wheezing; respirations unlabored   CV:  S1 S2, Regular rate and rhythm; no murmur, rub, or gallop  GI:  Soft, non-tender, non-distended; normal bowel sounds; no masses, no organomegaly   Rectal: Deferred  Musculoskeletal: No cyanosis, clubbing or edema  Normal ROM  Skin:  No jaundice, rashes, or lesion  Psych: Normal affect, good eye contact  Neuro: No gross deficits, AAOx3          REVIEW OF SYSTEMS:    CONSTITUTIONAL: Denies any fever, chills, rigors, and weight loss  HEENT: No earache or tinnitus  Denies hearing loss or visual disturbances  CARDIOVASCULAR: No chest pain or palpitations  RESPIRATORY: Denies any cough, hemoptysis, shortness of breath or dyspnea on exertion  GASTROINTESTINAL: As noted in the History of Present Illness  GENITOURINARY: No problems with urination  Denies any hematuria or dysuria  NEUROLOGIC: No dizziness or vertigo, denies headaches  MUSCULOSKELETAL: Denies any muscle or joint pain  SKIN: Denies skin rashes or itching  ENDOCRINE: Denies excessive thirst  Denies intolerance to heat or cold  PSYCHOSOCIAL: Denies depression or anxiety  Denies any recent memory loss

## 2021-07-16 ENCOUNTER — TELEPHONE (OUTPATIENT)
Dept: DERMATOLOGY | Facility: CLINIC | Age: 66
End: 2021-07-16

## 2021-07-16 NOTE — TELEPHONE ENCOUNTER
Returned pts call in regards to making a new patient appointment  Left message for patient to give us a call back at her earliest convenience

## 2021-07-20 ENCOUNTER — ANESTHESIA EVENT (OUTPATIENT)
Dept: GASTROENTEROLOGY | Facility: AMBULATORY SURGERY CENTER | Age: 66
End: 2021-07-20

## 2021-07-21 ENCOUNTER — ANESTHESIA (OUTPATIENT)
Dept: GASTROENTEROLOGY | Facility: AMBULATORY SURGERY CENTER | Age: 66
End: 2021-07-21

## 2021-07-21 ENCOUNTER — HOSPITAL ENCOUNTER (OUTPATIENT)
Dept: GASTROENTEROLOGY | Facility: AMBULATORY SURGERY CENTER | Age: 66
Discharge: HOME | End: 2021-07-21
Payer: MEDICARE

## 2021-07-21 VITALS
HEART RATE: 60 BPM | TEMPERATURE: 98.2 F | OXYGEN SATURATION: 94 % | RESPIRATION RATE: 16 BRPM | DIASTOLIC BLOOD PRESSURE: 59 MMHG | SYSTOLIC BLOOD PRESSURE: 108 MMHG

## 2021-07-21 DIAGNOSIS — Z86.010 HISTORY OF COLON POLYPS: ICD-10-CM

## 2021-07-21 PROCEDURE — G0105 COLORECTAL SCRN; HI RISK IND: HCPCS | Performed by: INTERNAL MEDICINE

## 2021-07-21 RX ORDER — PROPOFOL 10 MG/ML
INJECTION, EMULSION INTRAVENOUS CONTINUOUS PRN
Status: DISCONTINUED | OUTPATIENT
Start: 2021-07-21 | End: 2021-07-21

## 2021-07-21 RX ORDER — LIDOCAINE HYDROCHLORIDE 10 MG/ML
INJECTION, SOLUTION EPIDURAL; INFILTRATION; INTRACAUDAL; PERINEURAL AS NEEDED
Status: DISCONTINUED | OUTPATIENT
Start: 2021-07-21 | End: 2021-07-21

## 2021-07-21 RX ORDER — PROPOFOL 10 MG/ML
INJECTION, EMULSION INTRAVENOUS AS NEEDED
Status: DISCONTINUED | OUTPATIENT
Start: 2021-07-21 | End: 2021-07-21

## 2021-07-21 RX ORDER — SODIUM CHLORIDE, SODIUM LACTATE, POTASSIUM CHLORIDE, CALCIUM CHLORIDE 600; 310; 30; 20 MG/100ML; MG/100ML; MG/100ML; MG/100ML
50 INJECTION, SOLUTION INTRAVENOUS CONTINUOUS
Status: DISCONTINUED | OUTPATIENT
Start: 2021-07-21 | End: 2021-07-21

## 2021-07-21 RX ADMIN — LIDOCAINE HYDROCHLORIDE 50 MG: 10 INJECTION, SOLUTION EPIDURAL; INFILTRATION; INTRACAUDAL; PERINEURAL at 13:09

## 2021-07-21 RX ADMIN — PROPOFOL 140 MCG/KG/MIN: 10 INJECTION, EMULSION INTRAVENOUS at 13:09

## 2021-07-21 RX ADMIN — PROPOFOL 100 MG: 10 INJECTION, EMULSION INTRAVENOUS at 13:09

## 2021-07-21 RX ADMIN — SODIUM CHLORIDE, SODIUM LACTATE, POTASSIUM CHLORIDE, CALCIUM CHLORIDE 50 ML/HR: 600; 310; 30; 20 INJECTION, SOLUTION INTRAVENOUS at 12:57

## 2021-07-21 NOTE — INTERVAL H&P NOTE
H&P reviewed  After examining the patient I find no changes in the patients condition since the H&P had been written      Vitals:    07/21/21 1248   BP: 166/72   Pulse: 76   Resp: 19   Temp: 98 2 °F (36 8 °C)   SpO2: 98%

## 2021-07-21 NOTE — ANESTHESIA PREPROCEDURE EVALUATION
Procedure:  COLONOSCOPY  VERTIGO  Relevant Problems   CARDIO   (+) Acute deep vein thrombosis (DVT) of distal vein of lower extremity (HCC)   (+) Essential hypertension      ENDO   (+) Diabetes mellitus type 2 in obese (HCC)      NEURO/PSYCH   (+) History of colon polyps      PULMONARY   (+) Obstructive sleep apnea      Other   (+) Current use of long term anticoagulation   (+) Morbid obesity with BMI of 40 0-44 9, adult (HCC)   (+) Sleep-related hypoventilation        Physical Exam    Airway    Mallampati score: III  TM Distance: >3 FB  Neck ROM: full     Dental   No notable dental hx     Cardiovascular      Pulmonary      Other Findings        Anesthesia Plan  ASA Score- 3     Anesthesia Type- IV sedation with anesthesia with ASA Monitors  Additional Monitors:   Airway Plan:           Plan Factors-Exercise tolerance (METS): <4 METS  Chart reviewed  Patient is not a current smoker  Patient not instructed to abstain from smoking on day of procedure  Patient did not smoke on day of surgery  Induction- intravenous  Postoperative Plan-     Informed Consent- Anesthetic plan and risks discussed with patient  I personally reviewed this patient with the CRNA  Discussed and agreed on the Anesthesia Plan with the CRNA  Win Malloy

## 2021-08-02 LAB — HCV AB SER-ACNC: NON REACTIVE

## 2021-09-14 ENCOUNTER — OFFICE VISIT (OUTPATIENT)
Dept: DERMATOLOGY | Facility: CLINIC | Age: 66
End: 2021-09-14
Payer: MEDICARE

## 2021-09-14 VITALS — WEIGHT: 242.51 LBS | BODY MASS INDEX: 44.63 KG/M2 | TEMPERATURE: 98.6 F | HEIGHT: 62 IN

## 2021-09-14 DIAGNOSIS — L82.1 SEBORRHEIC KERATOSIS: Primary | ICD-10-CM

## 2021-09-14 PROCEDURE — 1124F ACP DISCUSS-NO DSCNMKR DOCD: CPT | Performed by: DERMATOLOGY

## 2021-09-14 PROCEDURE — 17000 DESTRUCT PREMALG LESION: CPT | Performed by: DERMATOLOGY

## 2021-09-14 PROCEDURE — 99203 OFFICE O/P NEW LOW 30 MIN: CPT | Performed by: DERMATOLOGY

## 2021-09-14 NOTE — PROGRESS NOTES
[facility] Note     Patient Name: Kofi Ring  Encounter Date: 9/14/2021     Have you been cared for by a [facility] Dermatologist in the last 3 years and, if so, which one? No    · Have you traveled outside of the [address] [place] [address] in the past 3 months or outside of the [facility] area in the last 2 weeks? No     May we call your Preferred Phone number to discuss your specific medical information? Yes     May we leave a detailed message that includes your specific medical information? Yes      Today's Chief Concerns:   Concern #1:  Full body skin check   Concern #2:  3 spots of concern, leg, face, and back    Past Medical History:  Have you personally ever had or currently have any of the following? · Skin cancer (such as Melanoma, Basal Cell Carcinoma, Squamous Cell Carcinoma? (If Yes, please provide more detail)- No  · Eczema: No  · Psoriasis: No  · HIV/AIDS: No  · Hepatitis B or C: No  · Tuberculosis: No  · Systemic Immunosuppression such as Diabetes, Biologic or Immunotherapy, Chemotherapy, Organ Transplantation, Bone Marrow Transplantation (If YES, please provide more detail): YES, Diabetes  · Radiation Treatment (If YES, please provide more detail): No  · Any other major medical conditions/concerns? (If Yes, which types)- No      Family History:  Have any of your "first degree relatives" (parent, brother, sister, or child) had any of the following       · Skin cancer such as Melanoma or Merkel Cell Carcinoma or Pancreatic Cancer? No  · Eczema, Asthma, Hay Fever or Seasonal Allergies: No  · Psoriasis or Psoriatic Arthritis: No  · Do any other medical conditions seem to run in your family? If Yes, what condition and which relatives?   No    Current Medications:   (please update all dermatological medications before printing patient's AVS!)      Current Outpatient Medications:     ELIQUIS 5 MG, Take 2 5 mg by mouth 2 (two) times a day , Disp: , Rfl: 3    levothyroxine 137 mcg tablet, Take 137 mcg by mouth daily , Disp: , Rfl:     lisinopril (ZESTRIL) 10 mg tablet, Take 10 mg by mouth daily , Disp: , Rfl:     metFORMIN (GLUCOPHAGE) 500 mg tablet, Take 500 mg by mouth daily with breakfast , Disp: , Rfl:     simvastatin (ZOCOR) 10 mg tablet, Take 10 mg by mouth daily at bedtime , Disp: , Rfl:       Review of Systems:  Have you recently had or currently have any of the following? If YES, what are you doing for the problem? · Fever, chills or unintended weight loss: No  · Sudden loss or change in your vision: No  · Nausea, vomiting or blood in your stool: No  · Painful or swollen joints: YES,   · Wheezing or cough: No  · Changing mole or non-healing wound: YES,   · Nosebleeds: No  · Excessive sweating: No  · Easy or prolonged bleeding? No  · Over the last 2 weeks, how often have you been bothered by the following problems? · Taking little interest or pleasure in doing things: 1 - Not at All  · Feeling down, depressed, or hopeless: 1 - Not at All  · Rapid heartbeat with epinephrine:  No    · FEMALES ONLY:    · Are you pregnant or planning to become pregnant? No  · Are you currently or planning to be nursing or breast feeding? No    · Any known allergies? · No Known Allergies      Physical Exam:     Was a chaperone (Derm Clinical Assistant) present throughout the entire Physical Exam? Yes     Did the Dermatology Team specifically  the patient on the importance of a Full Skin Exam to be sure that nothing is missed clinically?  Yes}  o Did the patient ultimately request or accept a Full Skin Exam?  NO  o Did the patient specifically refuse to have the areas "under-the-bra" examined by the Dermatologist? No  o Did the patient specifically refuse to have the areas "under-the-underwear" examined by the Dermatologist? No    CONSTITUTIONAL:   Vitals:    09/14/21 1247   Weight: 110 kg (242 lb 8 1 oz)   Height: 5' 2" (1 575 m)       PSYCH: Normal mood and affect  EYES: Normal conjunctiva  ENT: Normal lips and oral mucosa  CARDIOVASCULAR: No edema  RESPIRATORY: Normal respirations  HEME/LYMPH/IMMUNO:  No regional lymphadenopathy except as noted below in "ASSESSMENT AND PLAN BY DIAGNOSIS"    SKIN:  FULL ORGAN SYSTEM EXAM  Hair, Scalp, Ears, Face Normal except as noted below in Assessment   Neck, Cervical Chain Nodes Normal except as noted below in Assessment   Right Arm/Hand/Fingers Normal except as noted below in Assessment   Left Arm/Hand/Fingers Normal except as noted below in Assessment   Chest/Breasts/Axillae Viewed areas Normal except as noted below in Assessment   Abdomen, Umbilicus Normal except as noted below in Assessment   Back/Spine Normal except as noted below in Assessment   Groin/Genitalia/Buttocks Normal except as noted below in Assessment   Right Leg, Foot, Toes Normal except as noted below in Assessment   Left Leg, Foot, Toes Normal except as noted below in Assessment        Assessment and Plan by Diagnosis:    History of Present Condition:     Duration:  How long has this been an issue for you?    o  3 months   Location Affected:  Where on the body is this affecting you?    o  Face   Quality:  Is there any bleeding, pain, itch, burning/irritation, or redness associated with the skin lesion?    o  Denies   Severity:  Describe any bleeding, pain, itch, burning/irritation, or redness on a scale of 1 to 10 (with 10 being the worst)  o  9 bothers patient   Timing:  Does this condition seem to be there pretty constantly or do you notice it more at specific times throughout the day?    o  Constant   Context:  Have you ever noticed that this condition seems to be associated with specific activities you do?    o  Denies   Modifying Factors:    o Anything that seems to make the condition worse?    -  Denies  o What have you tried to do to make the condition better?     -  Denies   Associated Signs and Symptoms:  Does this skin lesion seem to be associated with any of the following:  o  SL AMB DERM SIGNS AND SYMPTOMS: Skin color changes       1  SEBORRHEIC KERATOSIS; IRRITATED    Physical Exam:   Anatomic Location Affected:  Forehead   Morphological Description:  Flat and raised, waxy, smooth to warty textured, yellow to brownish-grey to dark brown to blackish, discrete, "stuck-on" appearing papules  Additional History of Present Condition:  Patient reports new bumps on the skin  Denies itch, burn, pain, bleeding or ulceration  Present constantly; nothing seems to make it worse or better  Patient would like the spot removed, reports the spot is bothersome and irritated  Assessment and Plan:  Based on a thorough discussion of this condition and the management approach to it (including a comprehensive discussion of the known risks, side effects and potential benefits of treatment), the patient (family) agrees to implement the following specific plan:   Benign warty like growth  Discussed treating the spot today with Cryotherapy today  Consent was signed and obtained   If spot returns, call the office for a follow up to get another round of cryotherapy done  Seborrheic Keratosis  A seborrheic keratosis is a harmless warty spot that appears during adult life as a common sign of skin aging  Seborrheic keratoses can arise on any area of skin, covered or uncovered, with the usual exception of the palms and soles  They do not arise from mucous membranes  Seborrheic keratoses can have highly variable appearance  Seborrheic keratoses are extremely common  It has been estimated that over 90% of adults over the age of 61 years have one or more of them  They occur in males and females of all races, typically beginning to erupt in the 35s or 45s  They are uncommon under the age of 21 years  The precise cause of seborrhoeic keratoses is not known  Seborrhoeic keratoses are considered degenerative in nature   As time goes by, seborrheic keratoses tend to become more numerous  Some people inherit a tendency to develop a very large number of them; some people may have hundreds of them  The name "seborrheic keratosis" is misleading, because these lesions are not limited to a seborrhoeic distribution (scalp, mid-face, chest, upper back), nor are they formed from sebaceous glands, nor are they associated with sebum -- which is greasy  Seborrheic keratosis may also be called "SK," "Seb K," "basal cell papilloma," "senile wart," or "barnacle "      Researchers have noted:   Eruptive seborrhoeic keratoses can follow sunburn or dermatitis   Skin friction may be the reason they appear in body folds   Viral cause (e g , human papillomavirus) seems unlikely   Stable and clonal mutations or activation of FRFR3, PIK3CA, DEVON, AKT1 and EGFR genes are found in seborrhoeic keratoses   Seborrhoeic keratosis can arise from solar lentigo   FRFR3 mutations also arise in solar lentigines  These mutations are associated with increased age and location on the head and neck, suggesting a role of ultraviolet radiation in these lesions   Seborrheic keratoses do not harbour tumour suppressor gene mutations   Epidermal growth factor receptor inhibitors, which are used to treat some cancers, often result in an increase in verrucal (warty) keratoses  There is no easy way to remove multiple lesions on a single occasion  Unless a specific lesion is "inflamed" and is causing pain or stinging/burning or is bleeding, most insurance companies do not authorize treatment  PROCEDURE:  DESTRUCTION OF PRE-MALIGNANT LESIONS  After a thorough discussion of treatment options and risk/benefits/alternatives (including but not limited to local pain, scarring, dyspigmentation, blistering, and possible superinfection), verbal and written consent were obtained and the aforementioned lesions were treated on with cryotherapy using liquid nitrogen x 1 cycle for 5-10 seconds       TOTAL NUMBER of 1 pre-malignant lesions were treated today on the ANATOMIC LOCATION: Forehead  The patient tolerated the procedure well, and after-care instructions were provided          Scribe Attestation    I,:  Keon Means am acting as a scribe while in the presence of the attending physician :       I,:  Cherie Matias MD personally performed the services described in this documentation    as scribed in my presence :

## 2021-09-14 NOTE — PATIENT INSTRUCTIONS
SEBORRHEIC KERATOSIS; IRRITATED    Assessment and Plan:  Based on a thorough discussion of this condition and the management approach to it (including a comprehensive discussion of the known risks, side effects and potential benefits of treatment), the patient (family) agrees to implement the following specific plan:   Benign warty like growth  Discussed treating the spot today with Cryotherapy today  Consent was signed and obtained  ·    If spot returns, call the office for a follow up to get another round of cryotherapy done  Seborrheic Keratosis  A seborrheic keratosis is a harmless warty spot that appears during adult life as a common sign of skin aging  Seborrheic keratoses can arise on any area of skin, covered or uncovered, with the usual exception of the palms and soles  They do not arise from mucous membranes  Seborrheic keratoses can have highly variable appearance  Seborrheic keratoses are extremely common  It has been estimated that over 90% of adults over the age of 61 years have one or more of them  They occur in males and females of all races, typically beginning to erupt in the 35s or 45s  They are uncommon under the age of 21 years  The precise cause of seborrhoeic keratoses is not known  Seborrhoeic keratoses are considered degenerative in nature  As time goes by, seborrheic keratoses tend to become more numerous  Some people inherit a tendency to develop a very large number of them; some people may have hundreds of them  The name "seborrheic keratosis" is misleading, because these lesions are not limited to a seborrhoeic distribution (scalp, mid-face, chest, upper back), nor are they formed from sebaceous glands, nor are they associated with sebum -- which is greasy    Seborrheic keratosis may also be called "SK," "Seb K," "basal cell papilloma," "senile wart," or "barnacle "      Researchers have noted:   Eruptive seborrhoeic keratoses can follow sunburn or dermatitis   Skin friction may be the reason they appear in body folds   Viral cause (e g , human papillomavirus) seems unlikely   Stable and clonal mutations or activation of FRFR3, PIK3CA, DEVON, AKT1 and EGFR genes are found in seborrhoeic keratoses   Seborrhoeic keratosis can arise from solar lentigo   FRFR3 mutations also arise in solar lentigines  These mutations are associated with increased age and location on the head and neck, suggesting a role of ultraviolet radiation in these lesions   Seborrheic keratoses do not harbour tumour suppressor gene mutations   Epidermal growth factor receptor inhibitors, which are used to treat some cancers, often result in an increase in verrucal (warty) keratoses  There is no easy way to remove multiple lesions on a single occasion  Unless a specific lesion is "inflamed" and is causing pain or stinging/burning or is bleeding, most insurance companies do not authorize treatment

## 2022-03-27 ENCOUNTER — HOSPITAL ENCOUNTER (OUTPATIENT)
Dept: MRI IMAGING | Facility: HOSPITAL | Age: 67
Discharge: HOME | End: 2022-03-27
Payer: MEDICARE

## 2022-03-27 DIAGNOSIS — M77.42 METATARSALGIA, LEFT FOOT: ICD-10-CM

## 2022-03-27 PROCEDURE — 73718 MRI LOWER EXTREMITY W/O DYE: CPT

## 2022-03-27 PROCEDURE — G1004 CDSM NDSC: HCPCS

## 2022-09-01 ENCOUNTER — TELEPHONE (OUTPATIENT)
Dept: SLEEP CENTER | Facility: CLINIC | Age: 67
End: 2022-09-01

## 2022-09-01 NOTE — TELEPHONE ENCOUNTER
Patient left message on nurse line requesting Rx for a PAP mask (Airfit F30-i). Last office visit 2/2/2021 with Dr. Ladi Dixon via telemedicine. Patient scheduled for compliance follow-up appointment with Dr. Ladi Dixon 11/15/22 in [facility]. Dr. Ladi Dixon, please advise.

## 2022-09-09 NOTE — TELEPHONE ENCOUNTER
Returned patient's call. Was able to re-schedule compliance follow-up appointment with Dr. Naty Koo to 10/4/22 in [place].

## 2022-12-06 ENCOUNTER — OFFICE VISIT (OUTPATIENT)
Dept: SLEEP CENTER | Facility: HOSPITAL | Age: 67
End: 2022-12-06

## 2022-12-06 VITALS
HEART RATE: 64 BPM | DIASTOLIC BLOOD PRESSURE: 70 MMHG | HEIGHT: 62 IN | WEIGHT: 234 LBS | BODY MASS INDEX: 43.06 KG/M2 | SYSTOLIC BLOOD PRESSURE: 110 MMHG | OXYGEN SATURATION: 96 %

## 2022-12-06 DIAGNOSIS — E66.01 MORBID OBESITY WITH BMI OF 40.0-44.9, ADULT (HCC): ICD-10-CM

## 2022-12-06 DIAGNOSIS — E66.9 DIABETES MELLITUS TYPE 2 IN OBESE (HCC): ICD-10-CM

## 2022-12-06 DIAGNOSIS — I10 ESSENTIAL HYPERTENSION: ICD-10-CM

## 2022-12-06 DIAGNOSIS — E11.69 DIABETES MELLITUS TYPE 2 IN OBESE (HCC): ICD-10-CM

## 2022-12-06 DIAGNOSIS — G47.33 OBSTRUCTIVE SLEEP APNEA: Primary | ICD-10-CM

## 2022-12-06 DIAGNOSIS — R68.2 DRY MOUTH: ICD-10-CM

## 2022-12-06 DIAGNOSIS — G47.36 SLEEP RELATED HYPOVENTILATION IN CONDITIONS CLASSIFIED ELSEWHERE: ICD-10-CM

## 2022-12-06 RX ORDER — LEVOTHYROXINE SODIUM 0.12 MG/1
TABLET ORAL
COMMUNITY
Start: 2022-10-17

## 2022-12-06 NOTE — PATIENT INSTRUCTIONS

## 2022-12-06 NOTE — PROGRESS NOTES
Follow-Up Note - Sleep Center   Parul Pedersen  79 y o  female  :1955  RHV:[id number]  DOS:2022    CC: I saw this patient for follow-up in clinic today for Sleep disordered breathing, Coexisting Sleep and Medical Problems  She got a replacement DreamStation version to machine from [employer] over a year ago  Results of a split study in 2019: The diagnostic portion (undertaken with just the mandibular advancement device) demonstrated: AHI of 6 5 per hour, considerably  higher during stage REM  Intermittent snoring of moderate intensity was noted  Minimum oxygen saturation was 82 % 11 4% of time asleep was spent with saturations less than 90%          During the therapeutic portion of the study (with Pap added to the dental appliance), sleep disordered breathing was partially remediated with nasal CPAP at 7 cm H2O  The AHI at this setting was 7 5 per hour however data is likely skewed because she was observed only 16 minutes while asleep at this setting  Minimum oxygen saturation was maintained above 90%  She was observed during stage REM and while supine  Sleep efficiency improved from 59-79%    PFSH, Problem List, Medications & Allergies were reviewed in EMR  Interval changes: None reported  She  has a past medical history of Asthma, Colon polyp, Diabetes mellitus ([address] ), Disease of thyroid gland, DVT (deep venous thrombosis) ([facility] [address] ), Hyperlipidemia, Hypertension, Pulmonary embolism ([facility] [address] ), Sleep apnea, and Vertigo  She has a current medication list which includes the following prescription(s): eliquis, levothyroxine, levothyroxine, lisinopril, metformin, and simvastatin  PHYSIOLOGICAL DATA REVIEW AND INTERPRETATION: Using PAP > 4 hours/night 97%  Estimated JEAN CLAUDE 8 3/hour with pressure of 15cm [email or web address] percentile; dry mouth is improved  Patient has not been using ozone based devices to sanitize the machine      SUBJECTIVE: Regarding use of PAP, Beto reports:   · some adverse effects: mask causes redness / facial marks ; Dry mouth has improved; has not noticed any fibres or foreign material in air line  · She is benefiting from use: sleeping better   Sleep Routine: Forest Shafer reports getting 8 hrs sleep; she has no difficulty initiating or maintaining sleep   She arises spontaneously and feels refreshed  Forest Shafer denies excessive daytime sleepiness,  She rated [herself] at Total score: 3 /24 on the [place] Sleepiness Scale  Habits:[ reports that she quit smoking about 34 years ago  Her smoking use included cigarettes  She has quit using smokeless tobacco ], [ reports current alcohol use of about 1 0 standard drink per week ], [ reports no history of drug use ], Caffeine use:limited; Exercise routine: irregular  ROS: reviewed & as attached  Significant for some weight reduction since she joined weight [employer]  She reported no nasal, respiratory or cardiac symptoms  She was getting physical therapy for back pain  EXAM: /70 (BP Location: Left arm, Patient Position: Sitting, Cuff Size: Adult)   Pulse 64   Ht 5' 2" (1 575 m)   Wt 106 kg (234 lb)   LMP  (LMP Unknown)   SpO2 96%   BMI 42 80 kg/m²     Wt Readings from Last 3 Encounters:   12/06/22 106 kg (234 lb)   09/14/21 110 kg (242 lb 8 1 oz)   06/28/21 110 kg (242 lb)      Patient is well groomed; well appearing  CNS: Alert, orientated, clear & coherent speech  Psych: cooperative and in no distress  Mental state: Appears normal   H&N: EOMI; NC/AT: Nasal contusion, no other facial pressure marks, no rashes  Skin/Extrem: col & hydration normal; no edema  Resp: Respiratory effort is normal  Physical findings otherwise essentially unchanged from previous  IMPRESSION: Problem List Items & Comorbidities Addressed this Visit    1  Obstructive sleep apnea  PAP DME Resupply/Reorder      2  Sleep related hypoventilation in conditions classified elsewhere        3  Dry mouth        4  Essential hypertension        5  Morbid obesity with BMI of 40 0-44 9, adult ([facility] [address] )        6  Diabetes mellitus type 2 in obese ([facility] )            PLAN:  1  I reviewed results of prior studies and physiologic data with the patient  2  I discussed treatment options with risks and benefits  3  Treatment with  PAP is medically necessary and Kimberley Atkins is agreable to continue use  4  Care of equipment, methods to improve comfort using PAP and importance of compliance with therapy were discussed  5  Pressure setting: change 13-17 cmH2O     6  Rx provided to replace supplies and Care coordinated with DME provider  7  Discussed strategies for weight reduction  8  Follow-up is advised in 1 year or sooner if needed to monitor progress, compliance and to adjust therapy  Thank you for allowing me to participate in the care of this patient  Sincerely,     Authenticated electronically on 57/14/06   Board Certified Specialist     Portions of the record may have been created with voice recognition software  Occasional wrong word or "sound a like" substitutions may have occurred due to the inherent limitations of voice recognition software  There may also be notations and random deletions of words or characters from malfunctioning software  Read the chart carefully and recognize, using context, where substitutions/deletions have occurred

## 2022-12-07 ENCOUNTER — TELEPHONE (OUTPATIENT)
Dept: SLEEP CENTER | Facility: CLINIC | Age: 67
End: 2022-12-07

## 2022-12-08 LAB

## 2023-07-10 ENCOUNTER — TELEPHONE (OUTPATIENT)
Dept: SLEEP CENTER | Facility: CLINIC | Age: 68
End: 2023-07-10

## 2023-07-10 DIAGNOSIS — G47.33 OBSTRUCTIVE SLEEP APNEA: Primary | ICD-10-CM

## 2023-07-10 NOTE — TELEPHONE ENCOUNTER
Patient left message stating at her last office visit 12/6/22, her CPAP pressure was supposed to be changed but she is not sure if it was. Would like call back to confirm. Per office note:  Pressure setting: change 13-17 cmH2O. Dr. Duane Henley, no pressure change order was placed at that visit. Can you please place order? Called patient and advised pressure change was not ordered at last visit but that message will be sent to Dr. Duane Henley to write order. Patient agreeable.

## 2023-07-11 LAB
DME PARACHUTE DELIVERY DATE REQUESTED: NORMAL
DME PARACHUTE ITEM DESCRIPTION: NORMAL
DME PARACHUTE ORDER STATUS: NORMAL
DME PARACHUTE SUPPLIER NAME: NORMAL
DME PARACHUTE SUPPLIER PHONE: NORMAL

## 2023-12-13 ENCOUNTER — TELEPHONE (OUTPATIENT)
Dept: SLEEP CENTER | Facility: CLINIC | Age: 68
End: 2023-12-13

## 2024-04-15 ENCOUNTER — TELEPHONE (OUTPATIENT)
Dept: DERMATOLOGY | Facility: CLINIC | Age: 69
End: 2024-04-15

## 2024-07-09 ENCOUNTER — OFFICE VISIT (OUTPATIENT)
Dept: SLEEP CENTER | Facility: HOSPITAL | Age: 69
End: 2024-07-09
Payer: MEDICARE

## 2024-07-09 VITALS
BODY MASS INDEX: 45.08 KG/M2 | HEIGHT: 62 IN | WEIGHT: 245 LBS | SYSTOLIC BLOOD PRESSURE: 120 MMHG | DIASTOLIC BLOOD PRESSURE: 60 MMHG

## 2024-07-09 DIAGNOSIS — I10 ESSENTIAL HYPERTENSION: ICD-10-CM

## 2024-07-09 DIAGNOSIS — E66.01 MORBID OBESITY WITH BMI OF 40.0-44.9, ADULT (HCC): ICD-10-CM

## 2024-07-09 DIAGNOSIS — R68.2 DRY MOUTH: ICD-10-CM

## 2024-07-09 DIAGNOSIS — G47.09 OTHER INSOMNIA: ICD-10-CM

## 2024-07-09 DIAGNOSIS — G47.33 OBSTRUCTIVE SLEEP APNEA: Primary | ICD-10-CM

## 2024-07-09 DIAGNOSIS — G47.36 SLEEP RELATED HYPOVENTILATION IN CONDITIONS CLASSIFIED ELSEWHERE: ICD-10-CM

## 2024-07-09 PROCEDURE — 99214 OFFICE O/P EST MOD 30 MIN: CPT | Performed by: INTERNAL MEDICINE

## 2024-07-09 PROCEDURE — G2211 COMPLEX E/M VISIT ADD ON: HCPCS | Performed by: INTERNAL MEDICINE

## 2024-07-09 NOTE — PATIENT INSTRUCTIONS
What you can do to improve your sleep: (Sleep Hygiene) Basic rules for a good night's sleep  Create a regular sleep schedule. This will help you form a sleep routine. Keep a record of your sleep patterns, and any sleeping problems you have. Bring the record to follow-up visits with healthcare providers.  Avoid prolonged use of light-emitting screens before bedtime or watching TV in bed.  Avoid forcing sleep.  Do not take naps. Naps could make it hard for you to fall asleep at bedtime.  Deal with your worries before bedtime.  Keep your bedroom cool, quiet, and dark. Turn on white noise, such as a fan, to help you relax. Do not use your bed for any activity that will keep you awake. Do not read, exercise, eat, or watch TV in your bedroom.  Get up if you do not fall asleep within 20 minutes. Move to another room and do something relaxing until you become sleepy.  Limit caffeine, alcohol, nicotine and food to earlier in the day. Only drink caffeine in the morning. Do not drink alcohol within 6 hours of bedtime. Do not eat a heavy meal right before you go to bed. Avoid smoking, especially in the evening.  Exercise regularly. Daily exercise will help you sleep better. Do not exercise within 4 hours of bedtime.  Stimulus control therapy rules  1. Go to bed only when sleepy.  2. Do not watch television, read, eat, or worry while in bed. Use bed only for sleep and sex.  3. Get out of bed if unable to fall asleep within 20 minutes and go to another room. Return to bed only when sleepy. Repeat this step as many times as necessary throughout the night.  4. Set an alarm clock to wake up at a fixed time each morning, including weekends.  5. Do not take a nap during the day.  Data from: Majo RR, Ger ML. Nonpharmacologic treatments of insomnia. J Clin Psychiatry 1992; 53:37.  Go to AASM website for more information: Sleepeducation.org  Recommended Reading: Book by demetrius Hays   No More sleepless nights  Nursing  Support:  When: Monday through Friday 7A-5PM except holidays  Where: Our direct line is [phone].    If you are having a true emergency please call 911.  In the event that the line is busy or it is after hours please leave a voice message and we will return your call.  Please speak clearly, leaving your full name, birth date, best number to reach you and the reason for your call.   Medication refills: We will need the name of the medication, the dosage, the ordering provider, whether you get a 30 or 90 day refill, and the pharmacy name and address.  Medications will be ordered by the provider only.  Nurses cannot call in prescriptions.  Please allow 7 days for medication refills.  Physician requested updates: If your provider requested that you call with an update after starting medication, please be ready to provide us the medication and dosage, what time you take your medication, the time you attempt to fall asleep, time you fall asleep, when you wake up, and what time you get out of bed.  Sleep Study Results: We will contact you with sleep study results and/or next steps after the physician has reviewed your testing.

## 2024-07-09 NOTE — PROGRESS NOTES
Follow-Up Note - Sleep Center   Deidra Brower  69 y.o. female  :1955  MRN:[id number]  DOS:2024    CC: I saw this patient for follow-up in clinic today for Sleep disordered breathing, Coexisting Sleep and Medical Problems.  Patient received ot a  Dream Station Version 2 machine from [employer] over a year ago.  Interval changes: None reported.    Results of a split study in 2019: The diagnostic portion (undertaken with just the mandibular advancement device) demonstrated: AHI of 6.5 per hour, considerably  higher during stage REM.  Intermittent snoring of moderate intensity was noted. Minimum oxygen saturation was 82 % 11.4% of time asleep was spent with saturations less than 90%. During the therapeutic portion of the study (with Pap added to the dental appliance), sleep disordered breathing was partially remediated with nasal CPAP at 7 cm H2O.  The AHI at this setting was 7.5 per hour however data is likely skewed because she was observed only 16 minutes while asleep at this setting.  Minimum oxygen saturation was maintained above 90%.  She was observed during stage REM and while supine.  Sleep efficiency improved from 59-79%    PFSH, Problem List, Medications & Allergies were reviewed in EMR.   She  has a past medical history of Asthma, Colon polyp, Diabetes mellitus (HCC), Disease of thyroid gland, DVT (deep venous thrombosis) (HCC), Hyperlipidemia, Hypertension, Pulmonary embolism (HCC), Sleep apnea, and Vertigo.    She has a current medication list which includes the following prescription(s): eliquis, levothyroxine, lisinopril, metformin, simvastatin, and levothyroxine.    PHYSIOLOGICAL DATA REVIEW : Using PAP > 4 hours/night 100%. Estimated JEAN CLAUDE 3.9/hour with pressure of 15cm H2O@90th/95th percentile; patient has not been using non FDA approved devices to sanitize the machine.  INTERPRETATION: Compliance is Within accepted guidelines; Pressure setting is:optimal; ;   SUBJECTIVE: With respect to use  2 "of PAP, Deidra  is experiencing no adverse effects:.She derives benefit.  Is satisfied with sleep and daytime function.   Sleep Routine: Deidra reports getting 6-7 hrs sleep Vaibhav 9 hours in bed; she has no difficulty initiating, but reports diffculty maintaining sleep .  He is interrupted after approximately 4 hours and she has difficulty falling back asleep.  He is on her phone playing games for approximately an hour. She arises needing an alarm and is not always refreshed.Deidra reports excessive daytime sleepiness, takes planned naps.  She rated herself at Total score: (P) 6 /24 on the [place] Sleepiness Scale.   Other issues: Continues to use a mandibular advancement device helps to prevent dry mouth.     Habits: Reports that she quit smoking about 36 years ago. Her smoking use included cigarettes. She has quit using smokeless tobacco.,  Reports current alcohol use of about 1.0 standard drink of alcohol per week.,  Reports no history of drug use., Caffeine use:limited; Exercise routine: sometimes limited by knee pain for which she is getting physical therapy.      ROS: Significant for weight fluctuates in the range of a few pounds..    EXAM: /60   Ht 5' 2\" (1.575 m)   Wt 111 kg (245 lb)   LMP  (LMP Unknown)   BMI 44.81 kg/m²     Wt Readings from Last 3 Encounters:   07/09/24 111 kg (245 lb)   12/06/22 106 kg (234 lb)   09/14/21 110 kg (242 lb 8.1 oz)      Patient is well groomed; well appearing.   CNS: Alert, orientated, speech clear & coherent  Psych: cooperative and in no distress. Mental state: Appears normal.  H&N: EOMI; NC/AT: No facial pressure marks, no rashes.    Skin/Extrem: col & hydration normal; no edema  Resp: Respiratory effort is normal  Physical findings otherwise essentially unchanged from previous.    IMPRESSION: Problem List Items & Comorbidities Addressed this Visit    1. Obstructive sleep apnea  PAP DME Resupply/Reorder    Cpap DME      2. Other insomnia        3. Sleep " "related hypoventilation in conditions classified elsewhere        4. Dry mouth        5. Essential hypertension        6. Morbid obesity with BMI of 40.0-44.9, adult (HCC)            PLAN:  I reviewed results of prior studies and physiologic data with the patient.   I discussed treatment options with risks and benefits.  Treatment with  PAP is medically necessary and Deidra is agreable to continue use.   Care of equipment, methods to improve comfort using PAP and importance of compliance with therapy were discussed.  Pressure setting: change 13-16 cmH2O.    Rx provided to replace supplies and Care coordinated with DME provider.   The patient's current unit has now reached its 5 yr reasonable, useful life and needs to be replaced.  Multi component Cognitive behavioral therapy for Insomnia undertaken - Sleep Restriction, Stimulus control, Relaxation techniques and Sleep hygiene were discussed.  Discussed strategies for weight reduction.    Follow-up is advised in 2 months to monitor progress, compliance and to adjust therapy.     Thank you for allowing me to participate in the care of this patient.    Sincerely,     Authenticated electronically on 07/09/24   Board Certified Specialist     Portions of the record may have been created with voice recognition software. Occasional wrong word or \"sound a like\" substitutions may have occurred due to the inherent limitations of voice recognition software. There may also be notations and random deletions of words or characters from malfunctioning software. Read the chart carefully and recognize, using context, where substitutions/deletions have occurred.    "

## 2024-07-10 ENCOUNTER — TELEPHONE (OUTPATIENT)
Dept: SLEEP CENTER | Facility: CLINIC | Age: 69
End: 2024-07-10

## 2024-07-10 LAB

## 2024-07-15 NOTE — TELEPHONE ENCOUNTER
Current client of [facility] (Per chart review, has been a client for many years, since at least 2017).     Received the following messages from Enedelia Sanchez - [facility]:  Hello, please upload a signed copy of 3/4/2010 baseline sleep study and signed office visit note prior to baseline sleep study.     The sleep study isn't signed. We also need signed office visit note prior to baseline sleep study.   ------------------------------------------------    Split study (3/11/2019), office note prior to sleep study (1/8/2019) and Rx for CPAP uploaded to [facility] via [employer].

## 2024-07-17 LAB

## 2024-08-01 LAB

## 2024-10-09 ENCOUNTER — EVALUATION (OUTPATIENT)
Dept: PHYSICAL THERAPY | Facility: CLINIC | Age: 69
End: 2024-10-09
Payer: MEDICARE

## 2024-10-09 DIAGNOSIS — M25.562 LEFT KNEE PAIN, UNSPECIFIED CHRONICITY: Primary | ICD-10-CM

## 2024-10-09 PROCEDURE — 97162 PT EVAL MOD COMPLEX 30 MIN: CPT | Performed by: PHYSICAL THERAPIST

## 2024-10-09 PROCEDURE — 97530 THERAPEUTIC ACTIVITIES: CPT | Performed by: PHYSICAL THERAPIST

## 2024-10-09 NOTE — PROGRESS NOTES
PT Evaluation     Today's date: 10/9/2024  Patient name: Deidra Brower  : 1955  MRN: [id number]  Referring provider: Boo Box MD  Dx:   Encounter Diagnosis     ICD-10-CM    1. Left knee pain, unspecified chronicity  M25.562                      Assessment  Impairments: abnormal gait, activity intolerance, impaired balance, impaired physical strength, lacks appropriate home exercise program, pain with function, poor posture , poor body mechanics, activity limitations and endurance  Symptom irritability: moderate    Assessment details: Pt is 70 y/o female who would benefit from PT tx for L knee pain management, ROM/flexibility improvement, gait and balance training, L LE/core strengthening and HEP education.  Understanding of Dx/Px/POC: good     Prognosis: good    Goals  (Up to 6 weeks)  1. Independent and safe with HEP.  2. Independent and safe with self-stretching techniques to decrease L piriformis discomfort.  3. L LE MMT will increase by 1/2 degree at knee/hip to improve standing ADL's.  4. Improve amb on all surfaces with min or no discomfort in L knee.    Plan  Patient would benefit from: skilled physical therapy  Planned modality interventions: low level laser therapy    Planned therapy interventions: manual therapy, nerve gliding, neuromuscular re-education, patient/caregiver education, postural training, strengthening, stretching, therapeutic activities, therapeutic exercise, therapeutic training, transfer training, home exercise program, graded exercise, graded activity, functional ROM exercises, flexibility, gait training, balance/weight bearing training, activity modification and abdominal trunk stabilization    Frequency: 2x week  Duration in weeks: 6  Treatment plan discussed with: patient        Subjective Evaluation    History of Present Illness  Mechanism of injury: Pt is 70 y/o female with Hx of chronic L knee pain and L piriformis mm pain. Recently pt c/o worsening of symptoms. Pt  went to see an ortho specialist and received an steroid injection. Some improvement in pain management verbalized, but not completely. Pt was referred to [facility] for L knee evaluation and tx.  Current functional limitations: limited distance with amb, limited prolong standing due to pain, decreased stairs negotiation - step to step with HR use only, some L knee discomfort at night voiced.          Recurrent probem    Quality of life: fair    Patient Goals  Patient goals for therapy: increased strength, independence with ADLs/IADLs, return to sport/leisure activities, increased motion and decreased pain  Patient goal: to be able to walk about 45 min and imrpove stairs negotiation  Pain  Current pain ratin  At best pain ratin  At worst pain ratin  Quality: dull ache  Relieving factors: change in position, medications, rest, support, ice and relaxation  Aggravating factors: standing, walking and stair climbing  Progression: improved    Social Support  Steps to enter house: no  Stairs in house: no   Lives in: multiple-level home and one-story house (living on the first floor - no stairs.)  Lives with: friend.    Employment status: not working  Hand dominance: right      Diagnostic Tests  X-ray: abnormal  Treatments  Previous treatment: injection treatment, medication and physical therapy  Current treatment: injection treatment and medication        Objective     Static Posture   General Observations  Asymmetrical weight bearing and shifted right.     Head  Forward.    Shoulders  Rounded.    Lumbar Spine   Flattened and decreased lordosis.     Active Range of Motion   Left Knee   Normal active range of motion    Right Knee   Normal active range of motion    Strength/Myotome Testing     Left Hip   Planes of Motion   Flexion: 4+  Abduction: 4  Adduction: 4    Isolated Muscles   Gluteus mauricio: 4  Gluteus medius: 4    Right Hip   Planes of Motion   Flexion: 5  Abduction: 4+  Adduction: 4+    Isolated Muscles    Gluteus maximums: 5  Gluteus medius: 5    Left Knee   Flexion: 5  Prone flexion: 5  Extension: 4+  Quadriceps contraction: fair    Right Knee   Flexion: 5  Prone flexion: 5  Extension: 4+  Quadriceps contraction: good    Ambulation     Ambulation: Level Surfaces   Ambulation without assistive device: independent    Ambulation: Stairs   Ascend stairs: independent  Pattern: non-reciprocal  Railings: one rail  Descend stairs: independent  Pattern: non-reciprocal  Railings: one rail  Curbs: independent    Observational Gait   Gait: antalgic and asymmetric   Decreased walking speed and stride length.   Left foot contact pattern: heel to toe  Base of support: increased    Functional Assessment        Comments  Decreased u/l balance in standing: Fair (+)    General Comments:      Knee Comments  Decreased b/l HS flexibility noted             Precautions: not any given      Manuals 10/9            LA to L med knee as needed             L knee PROM/stretch                                       Neuro Re-Ed                          Bike with postural correction                                                                              Ther Ex             TKE w DF stretch w strap demo            Sitting AD's t-ball use demo            Sitting AB's TB use demo            Bridge w t-ball use demo            U/l knee to chest stretch w strap demo            L supine piriformis stretch demo                                      Ther Activity             HEP edu/review 8' given, TB given                         Gait Training                                       Modalities

## 2024-10-09 NOTE — LETTER
October 9, 2024      No Recipients    Patient: Deidra Brower   YOB: 1955   Date of Visit: 10/9/2024     Encounter Diagnosis     ICD-10-CM    1. Left knee pain, unspecified chronicity  M25.562           Dear Dr. Box:    Thank you for your recent referral of Deidra Brower. Please review the attached evaluation summary from Deidra's recent visit.     Please verify that you agree with the plan of care by signing the attached order.     If you have any questions or concerns, please do not hesitate to call.     I sincerely appreciate the opportunity to share in the care of one of your patients and hope to have another opportunity to work with you in the near future.       Sincerely,    Wallace Figueroa, PT      Referring Provider:      I certify that I have read the below Plan of Care and certify the need for these services furnished under this plan of treatment while under my care.                    Boo Box MD  [address] [place] [address]  Via Fax: [phone]          No notes on file

## 2024-10-11 ENCOUNTER — OFFICE VISIT (OUTPATIENT)
Dept: PHYSICAL THERAPY | Facility: CLINIC | Age: 69
End: 2024-10-11
Payer: MEDICARE

## 2024-10-11 DIAGNOSIS — M25.562 LEFT KNEE PAIN, UNSPECIFIED CHRONICITY: Primary | ICD-10-CM

## 2024-10-11 PROCEDURE — 97112 NEUROMUSCULAR REEDUCATION: CPT | Performed by: PHYSICAL THERAPIST

## 2024-10-11 PROCEDURE — 97110 THERAPEUTIC EXERCISES: CPT | Performed by: PHYSICAL THERAPIST

## 2024-10-11 NOTE — PROGRESS NOTES
"Daily Note     Today's date: 10/11/2024  Patient name: Deidra Brower  : 1955  MRN: [id number]  Referring provider: Boo Box MD  Dx:   Encounter Diagnosis     ICD-10-CM    1. Left knee pain, unspecified chronicity  M25.562                      Subjective: No new changes since IE verbalized. Performing HEP.      Objective: See treatment diary below      Assessment: Tolerated treatment well. Patient demonstrated fatigue post treatment and would benefit from continued PT fo further LE strengthening as mary jane in pain free range. HEP was updated. Pt is motivated and compliant with HEP.      Plan: Continue per plan of care.  Progress treatment as tolerated.       Precautions: not any given      Manuals 10/9 10/11           LA to L med knee as needed  SZ 4' at 12 W           L knee PROM/stretch  Self stretch                                     Neuro Re-Ed                          Rec Bike with postural correction  L1 8'                                                                            Ther Ex             TKE w DF stretch w strap demo            Sitting AD's t-ball use demo 10x2           Sitting AB's TB use demo Blue TB 10x2           Bridge w t-ball use demo 10x5\"           U/l knee to chest stretch w strap demo 3x20\" ea           L supine piriformis stretch demo 3x30\"           Supine heel slides  10x2           Supine SLR w DF/ FF  10x2           Sit to stand  10x5\"                                                  Ther Activity             HEP edu/review 8' given, TB given updated                        Gait Training                                       Modalities                                            "

## 2024-10-16 ENCOUNTER — OFFICE VISIT (OUTPATIENT)
Dept: PHYSICAL THERAPY | Facility: CLINIC | Age: 69
End: 2024-10-16
Payer: MEDICARE

## 2024-10-16 DIAGNOSIS — M25.562 LEFT KNEE PAIN, UNSPECIFIED CHRONICITY: Primary | ICD-10-CM

## 2024-10-16 PROCEDURE — 97112 NEUROMUSCULAR REEDUCATION: CPT | Performed by: PHYSICAL THERAPIST

## 2024-10-16 PROCEDURE — 97110 THERAPEUTIC EXERCISES: CPT | Performed by: PHYSICAL THERAPIST

## 2024-10-16 NOTE — PROGRESS NOTES
"Daily Note     Today's date: 10/16/2024  Patient name: Deidra Brower  : 1955  MRN: [id number]  Referring provider: Boo Box MD  Dx:   Encounter Diagnosis     ICD-10-CM    1. Left knee pain, unspecified chronicity  M25.562                      Subjective: No discomfort prior to tx voiced. (+) knee brace presented at tx.      Objective: See treatment diary below      Assessment: Tolerated treatment well. Patient demonstrated fatigue post treatment and would benefit from continued PT as mary jane. All therex was performed in pain free range.      Plan: Continue per plan of care.  Progress treatment as tolerated.       Precautions: not any given      Manuals 10/9 10/11 10/16          LA to L med knee as needed  SZ 4' at 12 W SZ 4' at 12 W          L knee PROM/stretch  Self stretch                                     Neuro Re-Ed                          Rec Bike with postural correction  L1 8' L1 8'                                                                           Ther Ex             TKE w DF stretch w strap demo  3x30\"          Sitting AD's t-ball use demo 10x2 10x2, 5\"          Sitting AB's TB use demo Blue TB 10x2 Black TB 10x2, 5\"          Bridge w t-ball use demo 10x5\" 10x5\"          U/l knee to chest stretch w strap demo 3x20\" ea 3x20\"          L supine piriformis stretch demo 3x30\" 3x30\"          Supine heel slides  10x2 #3 10x2          Supine SLR w DF/ FF  10x2 #3 10x2          Sit to stand  10x5\" 10x          Sitting TKE    #3 10x5\"                                    Ther Activity             HEP edu/review 8' given, TB given updated                        Gait Training                                       Modalities                                              "

## 2024-10-18 ENCOUNTER — OFFICE VISIT (OUTPATIENT)
Dept: PHYSICAL THERAPY | Facility: CLINIC | Age: 69
End: 2024-10-18
Payer: MEDICARE

## 2024-10-18 DIAGNOSIS — M25.562 LEFT KNEE PAIN, UNSPECIFIED CHRONICITY: Primary | ICD-10-CM

## 2024-10-18 PROCEDURE — 97110 THERAPEUTIC EXERCISES: CPT | Performed by: PHYSICAL THERAPIST

## 2024-10-18 PROCEDURE — 97112 NEUROMUSCULAR REEDUCATION: CPT | Performed by: PHYSICAL THERAPIST

## 2024-10-18 NOTE — PROGRESS NOTES
"Daily Note     Today's date: 10/18/2024  Patient name: Deidra Brower  : 1955  MRN: [id number]  Referring provider: Boo Box MD  Dx:   Encounter Diagnosis     ICD-10-CM    1. Left knee pain, unspecified chronicity  M25.562                      Subjective: No new changes since last tx. No pain voiced prior to tx.      Objective: See treatment diary below      Assessment: Tolerated treatment well. Patient demonstrated fatigue post treatment, exhibited good technique with therapeutic exercises, and would benefit from continued PT for further pain management and progression of therex as mary jane.      Plan: Continue per plan of care.  Progress treatment as tolerated.       Precautions: not any given      Manuals 10/9 10/11 10/16 10/18         LA to L med knee as needed  SZ 4' at 12 W SZ 4' at 12 W SZ 4' at 12 W         L knee PROM/stretch  Self stretch                                     Neuro Re-Ed                          Rec Bike with postural correction  L1 8' L1 8' L1 8'                                                                          Ther Ex             TKE w DF stretch w strap demo  3x30\"          Sitting AD's t-ball use demo 10x2 10x2, 5\" 10x3, 5\"         Sitting AB's TB use demo Blue TB 10x2 Black TB 10x2, 5\"          Bridge w t-ball use demo 10x5\" 10x5\"          U/l knee to chest stretch w strap demo 3x20\" ea 3x20\"          L supine piriformis stretch demo 3x30\" 3x30\"          Supine heel slides  10x2 #3 10x2          Supine SLR w DF/ FF  10x2 #3 10x2          Sit to stand  10x5\" 10x W t-ball 10x2         Sitting TKE    #3 10x5\"          Leg press B LE    L4 10x2         Step ups FF    6\" 10x2 u/l HR use                                                             Ther Activity             HEP edu/review 8' given, TB given updated                        Gait Training                                       Modalities                                                "

## 2024-10-22 ENCOUNTER — OFFICE VISIT (OUTPATIENT)
Dept: PHYSICAL THERAPY | Facility: CLINIC | Age: 69
End: 2024-10-22
Payer: MEDICARE

## 2024-10-22 DIAGNOSIS — M25.562 LEFT KNEE PAIN, UNSPECIFIED CHRONICITY: Primary | ICD-10-CM

## 2024-10-22 PROCEDURE — 97110 THERAPEUTIC EXERCISES: CPT | Performed by: PHYSICAL THERAPIST

## 2024-10-22 PROCEDURE — 97112 NEUROMUSCULAR REEDUCATION: CPT | Performed by: PHYSICAL THERAPIST

## 2024-10-22 NOTE — PROGRESS NOTES
"Daily Note     Today's date: 10/22/2024  Patient name: Deidra Brower  : 1955  MRN: [id number]  Referring provider: Boo Box MD  Dx:   Encounter Diagnosis     ICD-10-CM    1. Left knee pain, unspecified chronicity  M25.562                      Subjective: No new changes voiced. Amb without a knee brace. Some discomfort with prolong amb only.      Objective: See treatment diary below      Assessment: Tolerated treatment well. Patient demonstrated fatigue post treatment, exhibited good technique with therapeutic exercises, and would benefit from continued PT for further strengthening.      Plan: Continue per plan of care.  Progress treatment as tolerated.       Precautions: not any given      Manuals 10/9 10/11 10/16 10/18 10/22        LA to L med knee as needed  SZ 4' at 12 W SZ 4' at 12 W SZ 4' at 12 W SZ 4' at 12 W        L knee PROM/stretch  Self stretch                                     Neuro Re-Ed                          Rec Bike with postural correction  L1 8' L1 8' L1 8' L2 8'                                                                         Ther Ex             TKE w DF stretch w strap demo  3x30\"          Sitting AD's t-ball use demo 10x2 10x2, 5\" 10x3, 5\"         Sitting AB's TB use demo Blue TB 10x2 Black TB 10x2, 5\"          Bridge w t-ball use demo 10x5\" 10x5\"          U/l knee to chest stretch w strap demo 3x20\" ea 3x20\"          L supine piriformis stretch demo 3x30\" 3x30\"          Supine heel slides  10x2 #3 10x2          Supine SLR w DF/ FF  10x2 #3 10x2          Sit to stand  10x5\" 10x W t-ball 10x2 #4 10x2        Sitting TKE    #3 10x5\"  #3 10x2, 5\"        Leg press B LE    L4 10x2 L4 10x2        Step ups FF    6\" 10x2 u/l HR use 6\" 10x2 u/l HR use        Foam mini squats     10x2        Foam u/l balance     10x2        Standing SLR all dir     #3 10x2                     Ther Activity             HEP edu/review 8' given, TB given updated                        Gait Training   "                                     Modalities

## 2024-10-25 ENCOUNTER — APPOINTMENT (OUTPATIENT)
Dept: PHYSICAL THERAPY | Facility: CLINIC | Age: 69
End: 2024-10-25
Payer: MEDICARE

## 2024-10-29 ENCOUNTER — OFFICE VISIT (OUTPATIENT)
Dept: SLEEP CENTER | Facility: HOSPITAL | Age: 69
End: 2024-10-29
Payer: MEDICARE

## 2024-10-29 ENCOUNTER — OFFICE VISIT (OUTPATIENT)
Dept: PHYSICAL THERAPY | Facility: CLINIC | Age: 69
End: 2024-10-29
Payer: MEDICARE

## 2024-10-29 VITALS
BODY MASS INDEX: 44.9 KG/M2 | DIASTOLIC BLOOD PRESSURE: 72 MMHG | SYSTOLIC BLOOD PRESSURE: 120 MMHG | WEIGHT: 244 LBS | HEIGHT: 62 IN

## 2024-10-29 DIAGNOSIS — G47.36 SLEEP RELATED HYPOVENTILATION IN CONDITIONS CLASSIFIED ELSEWHERE: ICD-10-CM

## 2024-10-29 DIAGNOSIS — M25.562 LEFT KNEE PAIN, UNSPECIFIED CHRONICITY: Primary | ICD-10-CM

## 2024-10-29 DIAGNOSIS — E66.01 MORBID OBESITY WITH BMI OF 40.0-44.9, ADULT (HCC): ICD-10-CM

## 2024-10-29 DIAGNOSIS — G47.33 OBSTRUCTIVE SLEEP APNEA: Primary | ICD-10-CM

## 2024-10-29 DIAGNOSIS — G47.09 OTHER INSOMNIA: ICD-10-CM

## 2024-10-29 DIAGNOSIS — R68.2 DRY MOUTH: ICD-10-CM

## 2024-10-29 DIAGNOSIS — I10 ESSENTIAL HYPERTENSION: ICD-10-CM

## 2024-10-29 PROCEDURE — 97110 THERAPEUTIC EXERCISES: CPT | Performed by: PHYSICAL THERAPIST

## 2024-10-29 PROCEDURE — 99214 OFFICE O/P EST MOD 30 MIN: CPT | Performed by: INTERNAL MEDICINE

## 2024-10-29 PROCEDURE — G2211 COMPLEX E/M VISIT ADD ON: HCPCS | Performed by: INTERNAL MEDICINE

## 2024-10-29 PROCEDURE — 97112 NEUROMUSCULAR REEDUCATION: CPT | Performed by: PHYSICAL THERAPIST

## 2024-10-29 RX ORDER — SEMAGLUTIDE 0.68 MG/ML
0.5 INJECTION, SOLUTION SUBCUTANEOUS
COMMUNITY
Start: 2024-10-18

## 2024-10-29 NOTE — PROGRESS NOTES
Follow-Up Note - [facility]   Deidra Brower  69 y.o. female  :1955  MRN:[id number]  DOS:10/29/2024    CC: I saw this patient for follow-up in clinic today for Sleep disordered breathing, Coexisting Sleep and Medical Problems.. Interval changes: She got a replacement ResMed machine around 2 months ago.      Split study in 2019: The diagnostic portion (undertaken with just the mandibular advancement device) demonstrated: AHI of 6.5 per hour, considerably  higher during stage REM.  Intermittent snoring of moderate intensity was noted. Minimum oxygen saturation was 82 % 11.4% of time asleep was spent with saturations less than 90%..       During the therapeutic portion of the study (with Pap added to the dental appliance), sleep disordered breathing was partially remediated with nasal CPAP at 7 cm H2O.  The AHI at this setting was 7.5 per hour however data is likely skewed because she was observed only 16 minutes while asleep at this setting.  Minimum oxygen saturation was maintained above 90%.  She was observed during stage REM and while supine.  Sleep efficiency improved from 59-79%    PFSH, Problem List, Medications & Allergies were reviewed in EMR.   She  has a past medical history of Asthma, Colon polyp, Diabetes mellitus (HCC), Disease of thyroid gland, DVT (deep venous thrombosis) (HCC), Hyperlipidemia, Hypertension, Pulmonary embolism (HCC), Sleep apnea, and Vertigo.    She has a current medication list which includes the following prescription(s): eliquis, levothyroxine, lisinopril, ozempic (0.25 or 0.5 mg/dose), simvastatin, levothyroxine, and metformin.    PHYSIOLOGICAL DATA REVIEW : Using PAP > 4 hours/night 100%. Estimated JEAN CLAUDE 5.1/hour with pressure of 16cm H2O@90th/95th percentile; use is limited by  .  INTERPRETATION: Compliance is excellent; Pressure setting is:in acceptable range; ;   SUBJECTIVE: With respect to use of PAP, Deidra  is experiencing no adverse effects:.She derives benefit..  " Feels satisfied with sleep and daytime function.   Sleep Routine: Deidra reports getting (P) 7 hrs sleep for 9-1/2 hours in bed.; she has no difficulty initiating, but reports diffculty maintaining sleep . She arises spontaneously and  is not always refreshed.Deidra (P) Rarely]reports excessive daytime sleepiness,.  She rated herself at Total score: (P) 3 /24 on the [place] Sleepiness Scale.   Other issues: None reported.     Habits: Reports that she quit smoking about 36 years ago. Her smoking use included cigarettes. She has quit using smokeless tobacco.,  Reports current alcohol use of about 1.0 standard drink of alcohol per week.,  Reports no history of drug use., Caffeine use:limited until  ; Exercise routine: irregular to physical therapy.      ROS: Significant for stable within a few pounds.  Reporting no nasal, respiratory or cardiac symptoms.  She has Neve pain that can disturb sleep..    EXAM: /72   Ht 5' 2\" (1.575 m)   Wt 111 kg (244 lb)   LMP  (LMP Unknown)   BMI 44.63 kg/m²     Wt Readings from Last 3 Encounters:   10/29/24 111 kg (244 lb)   07/09/24 111 kg (245 lb)   12/06/22 106 kg (234 lb)      Patient is well groomed; well appearing.   CNS: Alert, orientated, speech clear & coherent  Psych: cooperative and in no distress. Mental state: Appears normal.  H&N: EOMI; NC/AT: No facial pressure marks, no rashes.    Skin/Extrem: col & hydration normal; no edema  Resp: Respiratory effort is normal  Physical findings otherwise essentially unchanged from previous.    IMPRESSION: Problem List Items & Comorbidities Addressed this Visit    1. Obstructive sleep apnea  PAP DME Pressure Change    PAP DME Resupply/Reorder      2. Sleep related hypoventilation in conditions classified elsewhere        3. Other insomnia        4. Dry mouth        5. Essential hypertension        6. Morbid obesity with BMI of 40.0-44.9, adult (HCC)            PLAN:  I reviewed results of prior studies and physiologic " "data with the patient.   I discussed treatment options with risks and benefits.  Treatment with  PAP is medically necessary and Deidra is agreable to continue use.   Care of equipment, methods to improve comfort using PAP and importance of compliance with therapy were discussed.  Pressure setting: change 14-17 cmH2O and a fullface mask.    Rx provided to replace supplies and Care coordinated with DME provider.   Discussed strategies for weight reduction.  Ozempic for diabetes may benefit from dose increase.  Multi component Cognitive behavioral therapy for Insomnia undertaken - Sleep Restriction, Stimulus control, Relaxation techniques and Sleep hygiene were discussed.  Follow-up is advised in 1 year or sooner if needed to monitor progress, compliance and to adjust therapy.     Thank you for allowing me to participate in the care of this patient.    Sincerely,     Authenticated electronically on 10/29/24   Board Certified Specialist     Portions of the record may have been created with voice recognition software. Occasional wrong word or \"sound a like\" substitutions may have occurred due to the inherent limitations of voice recognition software. There may also be notations and random deletions of words or characters from malfunctioning software. Read the chart carefully and recognize, using context, where substitutions/deletions have occurred.    "

## 2024-10-29 NOTE — PROGRESS NOTES
"Daily Note     Today's date: 10/29/2024  Patient name: Deidra Brower  : 1955  MRN: [id number]  Referring provider: Boo Box MD  Dx:   Encounter Diagnosis     ICD-10-CM    1. Left knee pain, unspecified chronicity  M25.562                      Subjective: Pt reports improvement in L knee, but c/o b/l hop core discomfort with amb.      Objective: See treatment diary below      Assessment: Tolerated treatment well. Patient demonstrated fatigue post treatment and would benefit from continued PT for further LE/core strengthening with also focus on b/l hip ex to improve quality of gait.      Plan: Continue per plan of care.  Progress treatment as tolerated.       Precautions: not any given      Manuals 10/9 10/11 10/16 10/18 10/22 10x22       LA to L med knee as needed  SZ 4' at 12 W SZ 4' at 12 W SZ 4' at 12 W SZ 4' at 12 W SZ 4' at 12 W       L knee PROM/stretch  Self stretch                                     Neuro Re-Ed                          Rec Bike with postural correction  L1 8' L1 8' L1 8' L2 8' L3 8'                                                                        Ther Ex             TKE w DF stretch w strap demo  3x30\"          Sitting AD's t-ball use demo 10x2 10x2, 5\" 10x3, 5\"         Sitting AB's TB use demo Blue TB 10x2 Black TB 10x2, 5\"          Bridge w t-ball use demo 10x5\" 10x5\"          U/l knee to chest stretch w strap demo 3x20\" ea 3x20\"          L supine piriformis stretch demo 3x30\" 3x30\"          Supine heel slides  10x2 #3 10x2          Supine SLR w DF/ FF  10x2 #3 10x2          Sit to stand  10x5\" 10x W t-ball 10x2 #4 10x2 #6 10x2       Sitting TKE    #3 10x5\"  #3 10x2, 5\" #3 10x2 ea LE, 3\" ea       Leg press B LE    L4 10x2 L4 10x2 L4 10x2       Step ups FF    6\" 10x2 u/l HR use 6\" 10x2 u/l HR use        Foam mini squats     10x2        Foam u/l balance     10x2        Standing SLR all dir     #3 10x2 #3 10x2       Standing HS curls      #3 10x2       Sitting marching    "    #3 10x3                                              Ther Activity             HEP edu/review 8' given, TB given updated                        Gait Training                                       Modalities

## 2024-10-30 ENCOUNTER — TELEPHONE (OUTPATIENT)
Dept: SLEEP CENTER | Facility: CLINIC | Age: 69
End: 2024-10-30

## 2024-11-01 ENCOUNTER — OFFICE VISIT (OUTPATIENT)
Dept: PHYSICAL THERAPY | Facility: CLINIC | Age: 69
End: 2024-11-01
Payer: MEDICARE

## 2024-11-01 DIAGNOSIS — M25.562 LEFT KNEE PAIN, UNSPECIFIED CHRONICITY: Primary | ICD-10-CM

## 2024-11-01 LAB

## 2024-11-01 PROCEDURE — 97112 NEUROMUSCULAR REEDUCATION: CPT | Performed by: PHYSICAL THERAPIST

## 2024-11-01 PROCEDURE — 97110 THERAPEUTIC EXERCISES: CPT | Performed by: PHYSICAL THERAPIST

## 2024-11-01 NOTE — PROGRESS NOTES
"Daily Note     Today's date: 2024  Patient name: Deidra Brower  : 1955  MRN: [id number]  Referring provider: Boo Box MD  Dx:   Encounter Diagnosis     ICD-10-CM    1. Left knee pain, unspecified chronicity  M25.562                      Subjective: Pt reports, L TKR Sx scheduled sometimes in . Pt was advised to continue core pre/op strengthening (as per pt.)      Objective: See treatment diary below      Assessment: Tolerated treatment well. Patient demonstrated fatigue post treatment and would benefit from continued PT for core strengthening. No worsening of discomfort post tx.      Plan: Continue per plan of care.  Progress treatment as tolerated.       Precautions: not any given      Manuals 10/9 10/11 10/16 10/18 10/22 10/29 11/1      LA to L med knee as needed  SZ 4' at 12 W SZ 4' at 12 W SZ 4' at 12 W SZ 4' at 12 W SZ 4' at 12 W       L knee PROM/stretch  Self stretch                                     Neuro Re-Ed                          Rec Bike with postural correction  L1 8' L1 8' L1 8' L2 8' L3 8' L3 8'                                                                       Ther Ex             TKE w DF stretch w strap demo  3x30\"          Sitting AD's t-ball use demo 10x2 10x2, 5\" 10x3, 5\"         Sitting AB's TB use demo Blue TB 10x2 Black TB 10x2, 5\"          Bridge w t-ball use demo 10x5\" 10x5\"          U/l knee to chest stretch w strap demo 3x20\" ea 3x20\"          L supine piriformis stretch demo 3x30\" 3x30\"          Supine heel slides  10x2 #3 10x2          Supine SLR w DF/ FF  10x2 #3 10x2          Sit to stand  10x5\" 10x W t-ball 10x2 #4 10x2 #6 10x2       Sitting TKE    #3 10x5\"  #3 10x2, 5\" #3 10x2 ea LE, 3\" ea       Leg press B LE    L4 10x2 L4 10x2 L4 10x2       Step ups FF    6\" 10x2 u/l HR use 6\" 10x2 u/l HR use        Foam mini squats     10x2        Foam u/l balance     10x2        Standing SLR all dir     #3 10x2 #3 10x2 #3 10x2, 5\"      Standing HS curls      #3 " 10x2       Sitting marching       #3 10x3 #3 10x2      Bridging       10x2      R S/L clam shells       10x2      R S/L AB's       #3 10x2                                Ther Activity             HEP edu/review 8' given, TB given updated     reviewed                   Gait Training                                       Modalities

## 2024-11-05 ENCOUNTER — OFFICE VISIT (OUTPATIENT)
Dept: PHYSICAL THERAPY | Facility: CLINIC | Age: 69
End: 2024-11-05
Payer: MEDICARE

## 2024-11-05 DIAGNOSIS — M25.562 LEFT KNEE PAIN, UNSPECIFIED CHRONICITY: Primary | ICD-10-CM

## 2024-11-05 PROCEDURE — 97112 NEUROMUSCULAR REEDUCATION: CPT | Performed by: PHYSICAL THERAPIST

## 2024-11-05 PROCEDURE — 97110 THERAPEUTIC EXERCISES: CPT | Performed by: PHYSICAL THERAPIST

## 2024-11-05 NOTE — PROGRESS NOTES
"Daily Note     Today's date: 2024  Patient name: Deidra Brower  : 1955  MRN: [id number]  Referring provider: Boo Box MD  Dx:   Encounter Diagnosis     ICD-10-CM    1. Left knee pain, unspecified chronicity  M25.562                      Subjective: No new changes. No discomfort at rest.      Objective: See treatment diary below      Assessment: Tolerated treatment well. Patient demonstrated fatigue post treatment and would benefit from continued PT for further core strengthening as mary jane. No pain post tx verbalized, just fatigue. Pt ia motivated and compliant with HEP.      Plan: Continue per plan of care.  Progress treatment as tolerated.       Precautions: not any given      Manuals 10/9 10/11 10/16 10/18 10/22 10/29 11/1 11/5     LA to L med knee as needed  SZ 4' at 12 W SZ 4' at 12 W SZ 4' at 12 W SZ 4' at 12 W SZ 4' at 12 W       L knee PROM/stretch  Self stretch                                     Neuro Re-Ed                          Rec Bike with postural correction  L1 8' L1 8' L1 8' L2 8' L3 8' L3 8' L3 8'                                                                      Ther Ex             TKE w DF stretch w strap demo  3x30\"          Sitting AD's t-ball use demo 10x2 10x2, 5\" 10x3, 5\"         Sitting AB's TB use demo Blue TB 10x2 Black TB 10x2, 5\"          Bridge w t-ball use demo 10x5\" 10x5\"          U/l knee to chest stretch w strap demo 3x20\" ea 3x20\"          L supine piriformis stretch demo 3x30\" 3x30\"          Supine heel slides  10x2 #3 10x2          Supine SLR w DF/ FF  10x2 #3 10x2          Sit to stand  10x5\" 10x W t-ball 10x2 #4 10x2 #6 10x2       Sitting TKE    #3 10x5\"  #3 10x2, 5\" #3 10x2 ea LE, 3\" ea       Leg press B LE    L4 10x2 L4 10x2 L4 10x2       Step ups FF    6\" 10x2 u/l HR use 6\" 10x2 u/l HR use        Foam mini squats     10x2        Foam u/l balance     10x2        Standing SLR all dir     #3 10x2 #3 10x2 #3 10x2, 5\" #3 10x2, 5\"     Standing HS curls      #3 " "10x2       Sitting marching       #3 10x3 #3 10x2      Bridging       10x2 T-ball 10x3, 5\" ea     R S/L clam shells       10x2 10x2     R S/L AB's       #3 10x2 #3 10x2                               Ther Activity             HEP edu/review 8' given, TB given updated     reviewed                   Gait Training                                       Modalities                                                        "

## 2024-11-08 ENCOUNTER — OFFICE VISIT (OUTPATIENT)
Dept: PHYSICAL THERAPY | Facility: CLINIC | Age: 69
End: 2024-11-08
Payer: MEDICARE

## 2024-11-08 DIAGNOSIS — M25.562 LEFT KNEE PAIN, UNSPECIFIED CHRONICITY: Primary | ICD-10-CM

## 2024-11-08 PROCEDURE — 97112 NEUROMUSCULAR REEDUCATION: CPT | Performed by: PHYSICAL THERAPIST

## 2024-11-08 PROCEDURE — 97110 THERAPEUTIC EXERCISES: CPT | Performed by: PHYSICAL THERAPIST

## 2024-11-08 NOTE — PROGRESS NOTES
"Daily Note     Today's date: 2024  Patient name: Deidra Brower  : 1955  MRN: [id number]  Referring provider: Boo Box MD  Dx:   Encounter Diagnosis     ICD-10-CM    1. Left knee pain, unspecified chronicity  M25.562                      Subjective: No new changes in L knee.      Objective: See treatment diary below      Assessment: Tolerated treatment well. Patient demonstrated fatigue post treatment and would benefit from continued PT for further core/hips strengthening ex as mary jane. No change in L knee - pt is scheduled to see ortho this Monday.      Plan: Continue per plan of care.  Progress treatment as tolerated.       Precautions: not any given      Manuals 10/9 10/11 10/16 10/18 10/22 10/29 11/1 11/5 11/8    LA to L med knee as needed  SZ 4' at 12 W SZ 4' at 12 W SZ 4' at 12 W SZ 4' at 12 W SZ 4' at 12 W       L knee PROM/stretch  Self stretch                                     Neuro Re-Ed                          Rec Bike with postural correction  L1 8' L1 8' L1 8' L2 8' L3 8' L3 8' L3 8' L2 8'                                                                     Ther Ex             TKE w DF stretch w strap demo  3x30\"          Sitting AD's t-ball use demo 10x2 10x2, 5\" 10x3, 5\"         Sitting AB's TB use demo Blue TB 10x2 Black TB 10x2, 5\"          Bridge w t-ball use demo 10x5\" 10x5\"          U/l knee to chest stretch w strap demo 3x20\" ea 3x20\"          L supine piriformis stretch demo 3x30\" 3x30\"          Supine heel slides  10x2 #3 10x2          Supine SLR w DF/ FF  10x2 #3 10x2          Sit to stand  10x5\" 10x W t-ball 10x2 #4 10x2 #6 10x2       Sitting TKE    #3 10x5\"  #3 10x2, 5\" #3 10x2 ea LE, 3\" ea       Leg press B LE    L4 10x2 L4 10x2 L4 10x2       Step ups FF    6\" 10x2 u/l HR use 6\" 10x2 u/l HR use        Foam mini squats     10x2        Foam u/l balance     10x2        Standing SLR all dir     #3 10x2 #3 10x2 #3 10x2, 5\" #3 10x2, 5\" #5 10x2 ea dir    Standing HS curls      #3 " "10x2       Sitting marching       #3 10x3 #3 10x2  #5 10x2    Bridging       10x2 T-ball 10x3, 5\" ea T-ball 10x2 5\" ea    R S/L clam shells       10x2 10x2     R S/L AB's       #3 10x2 #3 10x2 #5 15x1                              Ther Activity             HEP edu/review 8' given, TB given updated     reviewed                   Gait Training                                       Modalities                                                          "

## 2024-11-12 ENCOUNTER — OFFICE VISIT (OUTPATIENT)
Dept: PHYSICAL THERAPY | Facility: CLINIC | Age: 69
End: 2024-11-12
Payer: MEDICARE

## 2024-11-12 DIAGNOSIS — M25.562 LEFT KNEE PAIN, UNSPECIFIED CHRONICITY: Primary | ICD-10-CM

## 2024-11-12 PROCEDURE — 97164 PT RE-EVAL EST PLAN CARE: CPT | Performed by: PHYSICAL THERAPIST

## 2024-11-12 PROCEDURE — 97110 THERAPEUTIC EXERCISES: CPT | Performed by: PHYSICAL THERAPIST

## 2024-11-12 PROCEDURE — 97112 NEUROMUSCULAR REEDUCATION: CPT | Performed by: PHYSICAL THERAPIST

## 2024-11-12 NOTE — LETTER
November 12, 2024      No Recipients    Patient: Deidra Brower   YOB: 1955   Date of Visit: 11/12/2024     Encounter Diagnosis     ICD-10-CM    1. Left knee pain, unspecified chronicity  M25.562           Dear Dr. Box:    Thank you for your recent referral of Deidra Brower. Please review the attached evaluation summary from Deidra's recent visit.     Please verify that you agree with the plan of care by signing the attached order.     If you have any questions or concerns, please do not hesitate to call.     I sincerely appreciate the opportunity to share in the care of one of your patients and hope to have another opportunity to work with you in the near future.       Sincerely,    Wallace Figueroa, PT      Referring Provider:      I certify that I have read the below Plan of Care and certify the need for these services furnished under this plan of treatment while under my care.                    Boo Box MD  [address] [place] [address]  Via Fax: [phone]          No notes on file

## 2024-11-12 NOTE — PROGRESS NOTES
"Daily Note     Today's date: 2024  Patient name: Deidra Brower  : 1955  MRN: [id number]  Referring provider: Boo Box MD  Dx:   Encounter Diagnosis     ICD-10-CM    1. Left knee pain, unspecified chronicity  M25.562                      Subjective: Pt reports awaiting L TKR sometimes in 2025. No new changes in function verbalized.L LE pain with prolong amb.      Objective: See treatment diary below    (Working towards LTG's, 1 more week of PT tx and D/C with HEP)  1. Independent and safe with HEP.  2. Independent and safe with self-stretching techniques to decrease L piriformis discomfort.  3. L LE MMT will increase by 1/2 degree at knee/hip to improve standing ADL's.  4. Improve amb on all surfaces with min or no discomfort in L knee.    Pain: 2/10 L med region of the knee  Gait: amb without AD, c/o L knee pain after 5 min of amb,   Stairs: up/down step to step   Balance: Fair (+) u/l standing balance   R LE 5/5 at knee, L LE ext at knee 4+/5, HS 5/5 t/o       Assessment: Tolerated treatment well. Patient demonstrated fatigue post treatment and exhibited good technique with therapeutic exercises. HEP was reviewed. No worsening of symptoms post tx. Pt is reaching max pre-op potential.      Plan: Continue per plan of care. 2 more review sessions and D/C     Precautions: not any given      Manuals 10/9 10/11 10/16 10/18 10/22 10/29 11/1 11/5 11/8 11/12    LA to L med knee as needed  SZ 4' at 12 W SZ 4' at 12 W SZ 4' at 12 W SZ 4' at 12 W SZ 4' at 12 W        L knee PROM/stretch  Self stretch            Reassessment          SZ                  Neuro Re-Ed                            Rec Bike with postural correction  L1 8' L1 8' L1 8' L2 8' L3 8' L3 8' L3 8' L2 8' L3 8'                                                                          Ther Ex              TKE w DF stretch w strap demo  3x30\"           Sitting AD's t-ball use demo 10x2 10x2, 5\" 10x3, 5\"      10x3, 5\"    Sitting AB's " "TB use demo Blue TB 10x2 Black TB 10x2, 5\"       Black TB 10x3 5\" ea    Bridge w t-ball use demo 10x5\" 10x5\"           U/l knee to chest stretch w strap demo 3x20\" ea 3x20\"           L supine piriformis stretch demo 3x30\" 3x30\"           Supine heel slides  10x2 #3 10x2           Supine SLR w DF/ FF  10x2 #3 10x2           Sit to stand  10x5\" 10x W t-ball 10x2 #4 10x2 #6 10x2        Sitting TKE    #3 10x5\"  #3 10x2, 5\" #3 10x2 ea LE, 3\" ea        Leg press B LE    L4 10x2 L4 10x2 L4 10x2        Step ups FF    6\" 10x2 u/l HR use 6\" 10x2 u/l HR use         Foam mini squats     10x2         Foam u/l balance     10x2         Standing SLR all dir     #3 10x2 #3 10x2 #3 10x2, 5\" #3 10x2, 5\" #5 10x2 ea dir #5 10x2, 3\" hold, ea dir    Standing HS curls      #3 10x2        Sitting marching       #3 10x3 #3 10x2  #5 10x2 #5 10x3    Bridging       10x2 T-ball 10x3, 5\" ea T-ball 10x2 5\" ea     R S/L clam shells       10x2 10x2      R S/L AB's       #3 10x2 #3 10x2 #5 15x1                                 Ther Activity              HEP edu/review 8' given, TB given updated     reviewed                     Gait Training                                          Modalities                                                               "

## 2024-11-19 ENCOUNTER — APPOINTMENT (OUTPATIENT)
Dept: PHYSICAL THERAPY | Facility: CLINIC | Age: 69
End: 2024-11-19
Payer: MEDICARE

## 2024-11-22 ENCOUNTER — OFFICE VISIT (OUTPATIENT)
Dept: PHYSICAL THERAPY | Facility: CLINIC | Age: 69
End: 2024-11-22
Payer: MEDICARE

## 2024-11-22 DIAGNOSIS — M25.562 LEFT KNEE PAIN, UNSPECIFIED CHRONICITY: Primary | ICD-10-CM

## 2024-11-22 PROCEDURE — 97110 THERAPEUTIC EXERCISES: CPT | Performed by: PHYSICAL THERAPIST

## 2024-11-22 PROCEDURE — 97112 NEUROMUSCULAR REEDUCATION: CPT | Performed by: PHYSICAL THERAPIST

## 2024-11-22 PROCEDURE — 97530 THERAPEUTIC ACTIVITIES: CPT | Performed by: PHYSICAL THERAPIST

## 2024-11-22 NOTE — PROGRESS NOTES
"Daily Note     Today's date: 2024  Patient name: Deidra Brower  : 1955  MRN: [id number]  Referring provider: Boo Box MD  Dx:   Encounter Diagnosis     ICD-10-CM    1. Left knee pain, unspecified chronicity  M25.562                      Subjective: Pt reports core is feeling better, knee will be schedules for TKR in 2025.      Objective: See treatment diary below  (Goals met)  1. Independent and safe with HEP.  2. Independent and safe with self-stretching techniques to decrease L piriformis discomfort.  3. L LE MMT will increase by 1/2 degree at knee/hip to improve standing ADL's.  4. Improve amb on all surfaces with min or no discomfort in L knee.    Assessment: Tolerated treatment well. Patient exhibited good technique with therapeutic exercises and HEP review. No pain post tx verbalized. Max potential reached at this time.      Plan: D/C with HEP review.     Precautions: not any given      Manuals 10/9 10/11 10/16 10/18 10/22 10/29 11/1 11/5 11/8 11/12 11/22   LA to L med knee as needed  SZ 4' at 12 W SZ 4' at 12 W SZ 4' at 12 W SZ 4' at 12 W SZ 4' at 12 W        L knee PROM/stretch  Self stretch            Reassessment          SZ                  Neuro Re-Ed                            Rec Bike with postural correction  L1 8' L1 8' L1 8' L2 8' L3 8' L3 8' L3 8' L2 8' L3 8' L3 8'                                                                         Ther Ex              TKE w DF stretch w strap demo  3x30\"           Sitting AD's t-ball use demo 10x2 10x2, 5\" 10x3, 5\"      10x3, 5\" 10x3, 5\"   Sitting AB's TB use demo Blue TB 10x2 Black TB 10x2, 5\"       Black TB 10x3 5\" ea Black TB 10x3\"   Bridge w t-ball use demo 10x5\" 10x5\"           U/l knee to chest stretch w strap demo 3x20\" ea 3x20\"           L supine piriformis stretch demo 3x30\" 3x30\"           Supine heel slides  10x2 #3 10x2           Supine SLR w DF/ FF  10x2 #3 10x2           Sit to stand  10x5\" 10x W t-ball 10x2 #4 10x2 " "#6 10x2        Sitting TKE    #3 10x5\"  #3 10x2, 5\" #3 10x2 ea LE, 3\" ea        Leg press B LE    L4 10x2 L4 10x2 L4 10x2        Step ups FF    6\" 10x2 u/l HR use 6\" 10x2 u/l HR use         Foam mini squats     10x2         Foam u/l balance     10x2         Standing SLR all dir     #3 10x2 #3 10x2 #3 10x2, 5\" #3 10x2, 5\" #5 10x2 ea dir #5 10x2, 3\" hold, ea dir    Standing HS curls      #3 10x2        Sitting marching       #3 10x3 #3 10x2  #5 10x2 #5 10x3    Bridging       10x2 T-ball 10x3, 5\" ea T-ball 10x2 5\" ea  TB 10x2, 5\"   R S/L clam shells       10x2 10x2   Black TB 10x2   R S/L AB's       #3 10x2 #3 10x2 #5 15x1     Supine HS stretch w strap           3x30\"                 Ther Activity              HEP edu/review 8' given, TB given updated     reviewed    Reviewed 10' verbally                 Gait Training                                          Modalities                                                                 "

## 2025-01-09 ENCOUNTER — EVALUATION (OUTPATIENT)
Dept: PHYSICAL THERAPY | Facility: CLINIC | Age: 70
End: 2025-01-09
Payer: MEDICARE

## 2025-01-09 DIAGNOSIS — G89.29 CHRONIC PAIN OF LEFT KNEE: Primary | ICD-10-CM

## 2025-01-09 DIAGNOSIS — M25.562 CHRONIC PAIN OF LEFT KNEE: Primary | ICD-10-CM

## 2025-01-09 PROCEDURE — 97162 PT EVAL MOD COMPLEX 30 MIN: CPT | Performed by: PHYSICAL THERAPIST

## 2025-01-09 PROCEDURE — 97530 THERAPEUTIC ACTIVITIES: CPT | Performed by: PHYSICAL THERAPIST

## 2025-01-09 NOTE — PROGRESS NOTES
PT Evaluation     Today's date: 2025  Patient name: Deidra Brower  : 1955  MRN: [id number]  Referring provider: Boo Box MD  Dx:   Encounter Diagnosis     ICD-10-CM    1. Chronic pain of left knee  M25.562     G89.29                      Assessment  Impairments: abnormal gait, activity intolerance, impaired physical strength, lacks appropriate home exercise program, pain with function, activity limitations and endurance  Symptom irritability: moderate  Understanding of Dx/Px/POC: excellent     Prognosis: excellent    Goals  (2 sessions only for pre-op training)  1. Independent and safe with HEP.  2. Independent and safe with bed mobility and transfers for p/o recovery.  3. Independent and safe with education on p/o AD use for safe amb.  4. Independent with self-pain management for p/o recovery (ice/elevation/compression).    Plan  Patient would benefit from: skilled physical therapy    Planned therapy interventions: home exercise program and transfer training    Frequency: 1x week  Duration in weeks: 2  Treatment plan discussed with: patient        Subjective Evaluation    History of Present Illness  Mechanism of injury: Pt is 69 y/o female known to [facility] at  for L knee pain tx in the past. Pt is currently awaiting L TKR Sx on 25 and is here for 2 pre-op sessions to review HEP, functional mobility, alphonso and pain management.    Current functional limitations: (+) pain in L knee with dynamic ADL's and amb, decreased quality of gait on all surfaces.          Recurrent probem    Quality of life: good    Patient Goals  Patient goal: to learn all my exercises for TKR  Pain  Current pain ratin  At best pain ratin  At worst pain ratin  Quality: dull ache  Relieving factors: change in position  Aggravating factors: standing, walking, stair climbing and sitting  Progression: worsening    Social Support  Steps to enter house: no  Stairs in house: no   Lives in: multiple-level home  Lives  with: friend.    Employment status: not working  Hand dominance: right      Diagnostic Tests  X-ray: abnormal  CT scan: abnormal  Treatments  Previous treatment: physical therapy  Current treatment: injection treatment and medication        Objective     Static Posture   General Observations  Symmetrical weight bearing.     Head  Forward.    Shoulders  Rounded.    Thoracic Spine  Hyperkyphosis.    Lumbar Spine   Flattened and decreased lordosis.     Palpation     Additional Palpation Details  No discomfort to palpation. C/o khalida L knee pain with movement only.    Active Range of Motion   Left Knee   Normal active range of motion    Right Knee   Normal active range of motion    Passive Range of Motion   Left Knee   Normal passive range of motion    Right Knee   Normal passive range of motion    Strength/Myotome Testing     Left Knee   Flexion: 4+  Prone flexion: 4+  Extension: 4+    Right Knee   Normal strength  Quadriceps contraction: good    Ambulation     Ambulation: Level Surfaces   Ambulation without assistive device: independent    Observational Gait   Gait: antalgic and asymmetric   Right stance time, right swing time and right step length within functional limits. Decreased walking speed, stride length, left stance time, left swing time and left step length.   Left foot contact pattern: heel to toe  Right foot contact pattern: heel to toe  Base of support: normal             Precautions: not any at this time, pre-op HEP edu/review only      Manuals 1/9                                                                Neuro Re-Ed                                                                                                        Ther Ex             Supine quad sets demo            Supine glut sets demo            Supine heel slides w strap demo            Supine ankle pumps demo            Long sit strap HS stretch demo            Supine SLR demo            Sitting heel slides demo            B UE arms chair  push-ups demo                                                   Ther Activity             HEP edu/review 10' written instructions            Bed mobility edu next            Transfers edu next            AD use for p/o amb edu next            Pain manag edu p/o                                       Gait Training                                       Modalities

## 2025-01-09 NOTE — LETTER
January 9, 2025    Boo Box MD  [address] [place] [id number]    Patient: Deidra Brower   YOB: 1955   Date of Visit: 1/9/2025     Encounter Diagnosis     ICD-10-CM    1. Chronic pain of left knee  M25.562     G89.29           Dear Dr. Box:    Thank you for your recent referral of Deidra Brower. Please review the attached evaluation summary from Deidra's recent visit.     Please verify that you agree with the plan of care by signing the attached order.     If you have any questions or concerns, please do not hesitate to call.     I sincerely appreciate the opportunity to share in the care of one of your patients and hope to have another opportunity to work with you in the near future.       Sincerely,    Wallace Figueroa, PT      Referring Provider:      I certify that I have read the below Plan of Care and certify the need for these services furnished under this plan of treatment while under my care.                    Boo Box MD  [address] [place] [id number]  Via Fax: [phone]          No notes on file

## 2025-01-14 ENCOUNTER — OFFICE VISIT (OUTPATIENT)
Dept: PHYSICAL THERAPY | Facility: CLINIC | Age: 70
End: 2025-01-14
Payer: MEDICARE

## 2025-01-14 DIAGNOSIS — M25.562 CHRONIC PAIN OF LEFT KNEE: Primary | ICD-10-CM

## 2025-01-14 DIAGNOSIS — G89.29 CHRONIC PAIN OF LEFT KNEE: Primary | ICD-10-CM

## 2025-01-14 PROCEDURE — 97530 THERAPEUTIC ACTIVITIES: CPT | Performed by: PHYSICAL THERAPIST

## 2025-01-14 PROCEDURE — 97110 THERAPEUTIC EXERCISES: CPT | Performed by: PHYSICAL THERAPIST

## 2025-01-14 NOTE — PROGRESS NOTES
"Daily Note     Today's date: 2025  Patient name: Deidra Brower  : 1955  MRN: [id number]  Referring provider: Boo Box MD  Dx:   Encounter Diagnosis     ICD-10-CM    1. Chronic pain of left knee  M25.562     G89.29                      Subjective: No new changes.      Objective: See treatment diary below      Assessment: Tolerated treatment well. Patient exhibited good technique with therapeutic exercises and HEP review. Pt wad educated on p/o wound care/edema control, bed mobility and transfers reviewed. Info given on p/o LE wedge use,  a large CP info given, compression stockings info. Pt is safe with all ex tech.      Plan: Self D/C prior to Sx., HEP reviewed. Re-assessment p/o scheduled in near future.     Precautions: not any at this time, pre-op HEP edu/review only      Manuals                                                                Neuro Re-Ed                                                                                                        Ther Ex             Supine quad sets demo 10x5\" review           Supine glut sets demo 10x5\" reveiw           Supine heel slides w strap demo 5x15\"           Supine ankle pumps demo 10x5\"           Long sit strap HS stretch demo 5x15\"           Supine SLR demo 10x5\"           Sitting heel slides demo 5x15\"           B UE arms chair push-ups demo 10x                                                  Ther Activity             HEP edu/review 10' written instructions 6' review           Bed mobility edu next Good leg behind L LE 5' edu           Transfers edu next 3'           AD use for p/o amb edu next RW p/o use           Pain manag edu p/o  Info 3'given           LE p/o wedge  Info 3'given                        Gait Training                                       Modalities                                            "

## 2025-01-15 ENCOUNTER — APPOINTMENT (OUTPATIENT)
Dept: PHYSICAL THERAPY | Facility: CLINIC | Age: 70
End: 2025-01-15
Payer: MEDICARE

## 2025-01-22 ENCOUNTER — APPOINTMENT (OUTPATIENT)
Dept: PHYSICAL THERAPY | Facility: CLINIC | Age: 70
End: 2025-01-22
Payer: MEDICARE

## 2025-02-04 ENCOUNTER — HOSPITAL ENCOUNTER (EMERGENCY)
Facility: HOSPITAL | Age: 70
Discharge: HOME | End: 2025-02-04
Attending: EMERGENCY MEDICINE | Admitting: EMERGENCY MEDICINE
Payer: MEDICARE

## 2025-02-04 VITALS
SYSTOLIC BLOOD PRESSURE: 128 MMHG | DIASTOLIC BLOOD PRESSURE: 51 MMHG | TEMPERATURE: 97.7 F | HEART RATE: 70 BPM | RESPIRATION RATE: 18 BRPM

## 2025-02-04 DIAGNOSIS — R55 VASOVAGAL NEAR SYNCOPE: ICD-10-CM

## 2025-02-04 DIAGNOSIS — K59.00 CONSTIPATION: Primary | ICD-10-CM

## 2025-02-04 LAB
ANION GAP SERPL CALCULATED.3IONS-SCNC: 7 MMOL/L (ref 4–13)
BASOPHILS # BLD AUTO: 0.06 THOUSANDS/ΜL (ref 0–0.1)
BASOPHILS NFR BLD AUTO: 1 % (ref 0–1)
BUN SERPL-MCNC: 16 MG/DL (ref 5–25)
CALCIUM SERPL-MCNC: 9.3 MG/DL (ref 8.4–10.2)
CHLORIDE SERPL-SCNC: 103 MMOL/L (ref 96–108)
CO2 SERPL-SCNC: 28 MMOL/L (ref 21–32)
CREAT SERPL-MCNC: 1.02 MG/DL (ref 0.6–1.3)
EOSINOPHIL # BLD AUTO: 0.13 THOUSAND/ΜL (ref 0–0.61)
EOSINOPHIL NFR BLD AUTO: 1 % (ref 0–6)
ERYTHROCYTE [DISTWIDTH] IN BLOOD BY AUTOMATED COUNT: 13.5 % (ref 11.6–15.1)
GFR SERPL CREATININE-BSD FRML MDRD: 55 ML/MIN/1.73SQ M
GLUCOSE SERPL-MCNC: 109 MG/DL (ref 65–140)
HCT VFR BLD AUTO: 40.7 % (ref 34.8–46.1)
HGB BLD-MCNC: 13.1 G/DL (ref 11.5–15.4)
IMM GRANULOCYTES # BLD AUTO: 0.1 THOUSAND/UL (ref 0–0.2)
IMM GRANULOCYTES NFR BLD AUTO: 1 % (ref 0–2)
LYMPHOCYTES # BLD AUTO: 1.82 THOUSANDS/ΜL (ref 0.6–4.47)
LYMPHOCYTES NFR BLD AUTO: 18 % (ref 14–44)
MCH RBC QN AUTO: 28.9 PG (ref 26.8–34.3)
MCHC RBC AUTO-ENTMCNC: 32.2 G/DL (ref 31.4–37.4)
MCV RBC AUTO: 90 FL (ref 82–98)
MONOCYTES # BLD AUTO: 0.7 THOUSAND/ΜL (ref 0.17–1.22)
MONOCYTES NFR BLD AUTO: 7 % (ref 4–12)
NEUTROPHILS # BLD AUTO: 7.41 THOUSANDS/ΜL (ref 1.85–7.62)
NEUTS SEG NFR BLD AUTO: 72 % (ref 43–75)
NRBC BLD AUTO-RTO: 0 /100 WBCS
PLATELET # BLD AUTO: 269 THOUSANDS/UL (ref 149–390)
PMV BLD AUTO: 9.3 FL (ref 8.9–12.7)
POTASSIUM SERPL-SCNC: 4.2 MMOL/L (ref 3.5–5.3)
RBC # BLD AUTO: 4.53 MILLION/UL (ref 3.81–5.12)
SODIUM SERPL-SCNC: 138 MMOL/L (ref 135–147)
WBC # BLD AUTO: 10.22 THOUSAND/UL (ref 4.31–10.16)

## 2025-02-04 PROCEDURE — 85025 COMPLETE CBC W/AUTO DIFF WBC: CPT | Performed by: EMERGENCY MEDICINE

## 2025-02-04 PROCEDURE — 99283 EMERGENCY DEPT VISIT LOW MDM: CPT

## 2025-02-04 PROCEDURE — 36415 COLL VENOUS BLD VENIPUNCTURE: CPT | Performed by: EMERGENCY MEDICINE

## 2025-02-04 PROCEDURE — 80048 BASIC METABOLIC PNL TOTAL CA: CPT | Performed by: EMERGENCY MEDICINE

## 2025-02-04 PROCEDURE — 99285 EMERGENCY DEPT VISIT HI MDM: CPT | Performed by: EMERGENCY MEDICINE

## 2025-02-04 PROCEDURE — 96361 HYDRATE IV INFUSION ADD-ON: CPT

## 2025-02-04 PROCEDURE — 96360 HYDRATION IV INFUSION INIT: CPT

## 2025-02-04 PROCEDURE — 93005 ELECTROCARDIOGRAM TRACING: CPT

## 2025-02-04 RX ORDER — OXYCODONE HYDROCHLORIDE 5 MG/1
5 TABLET ORAL ONCE
Refills: 0 | Status: COMPLETED | OUTPATIENT
Start: 2025-02-04 | End: 2025-02-04

## 2025-02-04 RX ORDER — ACETAMINOPHEN 325 MG/1
650 TABLET ORAL ONCE
Status: COMPLETED | OUTPATIENT
Start: 2025-02-04 | End: 2025-02-04

## 2025-02-04 RX ADMIN — ACETAMINOPHEN 650 MG: 325 TABLET, FILM COATED ORAL at 08:25

## 2025-02-04 RX ADMIN — OXYCODONE HYDROCHLORIDE 5 MG: 5 TABLET ORAL at 09:54

## 2025-02-04 RX ADMIN — SODIUM CHLORIDE 1000 ML: 0.9 INJECTION, SOLUTION INTRAVENOUS at 07:27

## 2025-02-04 NOTE — ED PROVIDER NOTES
Time reflects when diagnosis was documented in both MDM as applicable and the Disposition within this note       Time User Action Codes Description Comment    2/4/2025  9:48 AM Amanda York Add [K59.00] Constipation     2/4/2025  9:48 AM Amanda York Add [R55] Vasovagal near syncope           ED Disposition       ED Disposition   Discharge    Condition   Stable    Date/Time   Tue Feb 4, 2025  9:47 AM    Comment   Deidra Brower discharge to home/self care.                   Assessment & Plan       Medical Decision Making  Patient with near syncopal episode after straining to have a bowel movement.  She has constipation.  Labs are within normal limits.  EKG without any arrhythmia.  The patient was advised to reduce narcotic pain use if she is using this very consistently every 4 hours.  Advised to increase her fluid intake and fiber intake.  Move around when able.  Patient is already on a stool softener and took a laxative.    Amount and/or Complexity of Data Reviewed  Independent Historian: caregiver     Details: Caregiver at bedside and shared that patient is receiving narcotic pain medicine every 4 hours for pain.  We did discuss reduction in this if able to assist with reducing narcotic induced constipation.  Labs: ordered. Decision-making details documented in ED Course.  ECG/medicine tests: ordered and independent interpretation performed.    Risk  OTC drugs.  Prescription drug management.        ED Course as of 02/04/25 0952   Tue Feb 04, 2025   0814 Labs all within normal limits.  WBC minimally elevated.  Patient is afebrile.  No left shift.   0834 Normal electrolytes.   0940 Patient feeling better. We did discuss attempting to space out oxycodone and use tylenol first for pain as I believe the narcotics are causing constipation. Patient does have a stool softener at home and took a laxative. Discussed high fiber diet and drinking fluids.       Medications   oxyCODONE (ROXICODONE) IR tablet 5 mg  (has no administration in time range)   sodium chloride 0.9 % bolus 1,000 mL (1,000 mL Intravenous New Bag 25 0288)   acetaminophen (TYLENOL) tablet 650 mg (650 mg Oral Given 25 7905)       ED Risk Strat Scores                                              History of Present Illness       Chief Complaint   Patient presents with    Knee Pain     Pt presents to the ED via EMS with c/o L knee pain. Pt has a hx of a L total knee replacement that was done on Thursday at [facility]. Per pt, she was taking oxy around the clock for the pain, and thinks she slept through a dose. When she woke up this morning, she attempted to have a BM, and was unable to. Her straining to use the bathroom with the position of her knee while she was on the toilet has caused her intense knee pain.        Past Medical History:   Diagnosis Date    Asthma     Colon polyp     Diabetes mellitus (HCC)     Disease of thyroid gland     DVT (deep venous thrombosis) (HCC)     Hyperlipidemia     Hypertension     Pulmonary embolism (HCC)     Sleep apnea     compliant with CPAP nightly    Vertigo     positional vertigo on left side, unable to lay on left      Past Surgical History:   Procedure Laterality Date    COLONOSCOPY      MENISCECTOMY Left     NEURECTOMY FOOT Left     TONSILLECTOMY      TOTAL KNEE ARTHROPLASTY Left       Family History   Problem Relation Age of Onset    Multiple myeloma Mother     Colon polyps Neg Hx     Colon cancer Neg Hx       Social History     Tobacco Use    Smoking status: Former     Current packs/day: 0.00     Types: Cigarettes     Quit date:      Years since quittin.1    Smokeless tobacco: Former   Vaping Use    Vaping status: Never Used   Substance Use Topics    Alcohol use: Yes     Alcohol/week: 1.0 standard drink of alcohol     Types: 1 Glasses of wine per week     Comment: very rarely    Drug use: No      E-Cigarette/Vaping    E-Cigarette Use Never User       E-Cigarette/Vaping Substances    Nicotine No      THC No     CBD No     Flavoring No     Other No     Unknown No       I have reviewed and agree with the history as documented.     This is a 70-year-old female who presents to the emergency department with complaint of near syncopal episode today.  Patient states that she has been having a lot of pain from a knee replacement that was on Thursday.  She was in the bathroom today as she has been having constipation.  She was straining heavily and started to feel lightheaded, some shortness of breath and generally weak.  She said her pain was worse than usual.  Decreased p.o. intake.  She has been taking oxycodone regularly and took her last dose at 4:45 AM.  Currently feeling better while supine.  Differential diagnosis includes dehydration, vasovagal episode, arrhythmia, electrolyte abnormality.        Review of Systems   Constitutional:  Negative for activity change, appetite change and fever.   HENT:  Negative for congestion, ear pain, rhinorrhea and sore throat.    Eyes:  Negative for pain and redness.   Respiratory:  Negative for cough, shortness of breath and wheezing.    Cardiovascular:  Negative for chest pain and palpitations.   Gastrointestinal:  Negative for abdominal pain, diarrhea, nausea and vomiting.   Endocrine: Negative for polyuria.   Genitourinary:  Negative for difficulty urinating, dysuria, frequency and urgency.   Musculoskeletal:  Positive for arthralgias. Negative for myalgias.   Skin:  Negative for color change and rash.   Allergic/Immunologic: Negative for immunocompromised state.   Neurological:  Positive for light-headedness. Negative for dizziness and syncope.   Hematological:  Does not bruise/bleed easily.   Psychiatric/Behavioral:  Negative for confusion.            Objective       ED Triage Vitals   Temperature Pulse Blood Pressure Respirations SpO2 Patient Position - Orthostatic VS   02/04/25 0651 02/04/25 0651 02/04/25 0651 02/04/25 0651 -- 02/04/25 0651   97.7 °F (36.5 °C) 70  128/51 18  Lying      Temp Source Heart Rate Source BP Location FiO2 (%) Pain Score    02/04/25 0651 02/04/25 0651 02/04/25 0651 -- 02/04/25 0825    Oral Monitor Right arm  3      Vitals      Date and Time Temp Pulse SpO2 Resp BP Pain Score FACES Pain Rating User   02/04/25 0825 -- -- -- -- -- 3 -- CAC   02/04/25 0651 97.7 °F (36.5 °C) 70 -- 18 128/51 -- -- NB            Physical Exam  Vitals and nursing note reviewed.   Constitutional:       General: She is not in acute distress.     Appearance: She is well-developed.   HENT:      Head: Normocephalic and atraumatic.      Nose: Nose normal.      Mouth/Throat:      Mouth: Mucous membranes are dry.      Pharynx: No oropharyngeal exudate or posterior oropharyngeal erythema.   Eyes:      General: No scleral icterus.     Extraocular Movements: Extraocular movements intact.      Conjunctiva/sclera: Conjunctivae normal.      Pupils: Pupils are equal, round, and reactive to light.   Cardiovascular:      Rate and Rhythm: Normal rate and regular rhythm.      Heart sounds: Normal heart sounds.   Pulmonary:      Effort: Pulmonary effort is normal. No respiratory distress.      Breath sounds: Normal breath sounds. No stridor. No wheezing.   Abdominal:      General: There is no distension.      Palpations: Abdomen is soft.      Tenderness: There is no abdominal tenderness. There is no guarding or rebound.   Musculoskeletal:         General: Swelling (Mild appropriate postop pain.) present. No tenderness or deformity.      Cervical back: Normal range of motion and neck supple.      Comments: Left knee dressing is intact.  There is no erythema.  No warmth.   Skin:     General: Skin is warm and dry.      Findings: No rash.   Neurological:      Mental Status: She is alert and oriented to person, place, and time.   Psychiatric:         Thought Content: Thought content normal.         Results Reviewed       Procedure Component Value Units Date/Time    Basic metabolic panel [[id number]]  Collected: 02/04/25 0728    Lab Status: Final result Specimen: Blood from Arm, Right Updated: 02/04/25 0814     Sodium 138 mmol/L      Potassium 4.2 mmol/L      Chloride 103 mmol/L      CO2 28 mmol/L      ANION GAP 7 mmol/L      BUN 16 mg/dL      Creatinine 1.02 mg/dL      Glucose 109 mg/dL      Calcium 9.3 mg/dL      eGFR 55 ml/min/1.73sq m     Narrative:      National Kidney Disease Foundation guidelines for Chronic Kidney Disease (CKD):     Stage 1 with normal or high GFR (GFR > 90 mL/min/1.73 square meters)    Stage 2 Mild CKD (GFR = 60-89 mL/min/1.73 square meters)    Stage 3A Moderate CKD (GFR = 45-59 mL/min/1.73 square meters)    Stage 3B Moderate CKD (GFR = 30-44 mL/min/1.73 square meters)    Stage 4 Severe CKD (GFR = 15-29 mL/min/1.73 square meters)    Stage 5 End Stage CKD (GFR <15 mL/min/1.73 square meters)  Note: GFR calculation is accurate only with a steady state creatinine    CBC and differential [[id number]]  (Abnormal) Collected: 02/04/25 0728    Lab Status: Final result Specimen: Blood from Arm, Right Updated: 02/04/25 0814     WBC 10.22 Thousand/uL      RBC 4.53 Million/uL      Hemoglobin 13.1 g/dL      Hematocrit 40.7 %      MCV 90 fL      MCH 28.9 pg      MCHC 32.2 g/dL      RDW 13.5 %      MPV 9.3 fL      Platelets 269 Thousands/uL      nRBC 0 /100 WBCs      Segmented % 72 %      Immature Grans % 1 %      Lymphocytes % 18 %      Monocytes % 7 %      Eosinophils Relative 1 %      Basophils Relative 1 %      Absolute Neutrophils 7.41 Thousands/µL      Absolute Immature Grans 0.10 Thousand/uL      Absolute Lymphocytes 1.82 Thousands/µL      Absolute Monocytes 0.70 Thousand/µL      Eosinophils Absolute 0.13 Thousand/µL      Basophils Absolute 0.06 Thousands/µL             No orders to display       ECG 12 Lead Documentation Only    Date/Time: 2/4/2025 7:48 AM    Performed by: Amanda York MD  Authorized by: Amanda York MD    Indications / Diagnosis:  Near syncope  ECG reviewed  by me, the ED Provider: yes    Patient location:  ED  Interpretation:     Interpretation: normal    Rate:     ECG rate assessment: normal    Rhythm:     Rhythm: sinus rhythm    Ectopy:     Ectopy: none    QRS:     QRS axis:  Normal    QRS intervals:  Normal  Conduction:     Conduction: normal    ST segments:     ST segments:  Normal  T waves:     T waves: normal        ED Medication and Procedure Management   Prior to Admission Medications   Prescriptions Last Dose Informant Patient Reported? Taking?   ELIQUIS 5 MG  Self Yes No   Sig: Take 2.5 mg by mouth 2 (two) times a day    Ozempic, 0.25 or 0.5 MG/DOSE, 2 MG/3ML injection pen   Yes No   Sig: Inject 0.5 mg under the skin every 7 days   levothyroxine 125 mcg tablet   Yes No   levothyroxine 137 mcg tablet  Self Yes No   Sig: Take 137 mcg by mouth daily    lisinopril (ZESTRIL) 10 mg tablet  Self Yes No   Sig: Take 10 mg by mouth daily    metFORMIN (GLUCOPHAGE) 500 mg tablet  Self Yes No   Sig: Take 500 mg by mouth daily with breakfast    simvastatin (ZOCOR) 10 mg tablet  Self Yes No   Sig: Take 10 mg by mouth daily at bedtime       Facility-Administered Medications: None     Patient's Medications   Discharge Prescriptions    No medications on file     No discharge procedures on file.  ED SEPSIS DOCUMENTATION   Time reflects when diagnosis was documented in both MDM as applicable and the Disposition within this note       Time User Action Codes Description Comment    2/4/2025  9:48 AM Amanda York Add [K59.00] Constipation     2/4/2025  9:48 AM Amanda York [R55] Vasovagal near syncope                  Amanda York MD  02/04/25 0952

## 2025-02-05 LAB
ATRIAL RATE: 71 BPM
P AXIS: 69 DEGREES
PR INTERVAL: 152 MS
QRS AXIS: 57 DEGREES
QRSD INTERVAL: 88 MS
QT INTERVAL: 406 MS
QTC INTERVAL: 442 MS
T WAVE AXIS: 46 DEGREES
VENTRICULAR RATE: 71 BPM

## 2025-02-05 PROCEDURE — 93010 ELECTROCARDIOGRAM REPORT: CPT | Performed by: INTERNAL MEDICINE

## 2025-02-14 ENCOUNTER — APPOINTMENT (OUTPATIENT)
Dept: PHYSICAL THERAPY | Facility: CLINIC | Age: 70
End: 2025-02-14
Payer: MEDICARE

## 2025-02-17 ENCOUNTER — EVALUATION (OUTPATIENT)
Dept: PHYSICAL THERAPY | Facility: CLINIC | Age: 70
End: 2025-02-17
Payer: MEDICARE

## 2025-02-17 ENCOUNTER — APPOINTMENT (OUTPATIENT)
Dept: PHYSICAL THERAPY | Facility: CLINIC | Age: 70
End: 2025-02-17
Payer: MEDICARE

## 2025-02-17 DIAGNOSIS — Z96.652 S/P TKR (TOTAL KNEE REPLACEMENT), LEFT: ICD-10-CM

## 2025-02-17 DIAGNOSIS — M25.562 ACUTE PAIN OF LEFT KNEE: ICD-10-CM

## 2025-02-17 DIAGNOSIS — Z96.652 PRESENCE OF ARTIFICIAL KNEE JOINT, LEFT: Primary | ICD-10-CM

## 2025-02-17 PROCEDURE — 97162 PT EVAL MOD COMPLEX 30 MIN: CPT | Performed by: PHYSICAL THERAPIST

## 2025-02-17 PROCEDURE — 97530 THERAPEUTIC ACTIVITIES: CPT | Performed by: PHYSICAL THERAPIST

## 2025-02-17 NOTE — PROGRESS NOTES
PT Evaluation     Today's date: 2025  Patient name: Deidra Brower  : 1955  MRN: [id number]  Referring provider: Boo Box MD  Dx:   Encounter Diagnosis     ICD-10-CM    1. Presence of artificial knee joint, left  Z96.652       2. S/P TKR (total knee replacement), left  Z96.652       3. Acute pain of left knee  M25.562                      Assessment  Impairments: abnormal gait, abnormal or restricted ROM, activity intolerance, impaired balance, impaired physical strength, lacks appropriate home exercise program, pain with function, poor body mechanics, activity limitations and endurance  Symptom irritability: moderate  Understanding of Dx/Px/POC: excellent     Prognosis: excellent    Goals  (STG's - 3 weeks)  1. S with all HEP  2. L knee flexion up to 90 degrees to improve L knee flexibility with ADL's.  3. S with amb using u/l AD for level surface amb.    (LTG's - 6 weeks)  1. Independent and safe with HEP.  2. Independent and safe with amb on all surfaces without AD.  3. Independent with self stretching tech to max L knee AROM at least 110 degrees of flexion.  4. L knee MMT at lest 4/5 t/o to max safe standing ADL's post tD/C.    Plan  Patient would benefit from: skilled physical therapy    Planned therapy interventions: joint mobilization, manual therapy, neuromuscular re-education, patient/caregiver education, postural training, strengthening, stretching, therapeutic activities, therapeutic exercise, therapeutic training, transfer training, home exercise program, graded exercise, graded activity, functional ROM exercises, flexibility, body mechanics training, balance/weight bearing training, balance, activity modification and abdominal trunk stabilization    Frequency: 2-3 x per week.  Duration in weeks: 6  Treatment plan discussed with: patient        Subjective Evaluation    History of Present Illness  Date of surgery: 2025  Mechanism of injury: surgery  Mechanism of injury: Pt is 70  y/o female known to [facility] at , s/p L TKR due to OA on 25.  Pt was receiving home PT p/o and is currently here for [facility] PT L knee evaluation and tx p/o ad continuation of rehab.  Current functional limitations: decreased L knee AROM and mm strength, decreased quality of gait on all surfaces (using a RW at this time), no position of comfort at night, discomfort with prolong sitting/standing, extra time needed with transfers/bend mobility - extra time needed, (+) pain with movement in L knee.          Not a recurrent problem   Quality of life: poor    Patient Goals  Patient goals for therapy: increased strength, independence with ADLs/IADLs, decreased pain, improved balance, increased motion and return to sport/leisure activities  Patient goal: to be able to walk and to on trips  Pain  Current pain ratin  At best pain rating: 3  At worst pain ratin  Quality: sharp, dull ache and radiating  Relieving factors: change in position, medications and ice  Aggravating factors: sitting, standing, stair climbing and walking  Progression: improved    Social Support  Steps to enter house: no  Stairs in house: yes (1 flight with u/l HR)   Lives in: multiple-level home  Lives with: friends, family.    Employment status: not working  Hand dominance: right      Diagnostic Tests  X-ray: normal (p/o)  Treatments  Previous treatment: physical therapy  Current treatment: home therapy and medication      Objective     Observations   Left Knee   Positive for edema and incision.     Palpation     Additional Palpation Details  C/o L knee generalized discomfort with movement, not to palpation    Active Range of Motion   Left Knee   Flexion: 60 degrees with pain  Prone flexion: 60 degrees with pain  Extension: 0 degrees     Right Knee   Normal active range of motion    Passive Range of Motion   Left Knee   Flexion: 80 degrees with pain  Prone flexion: 70 degrees with pain  Extension: 0 degrees     Strength/Myotome Testing      Left Knee   Flexion: 2+  Prone flexion: 2+  Extension: 3  Quadriceps contraction: fair    Right Knee   Normal strength    Ambulation   Weight-Bearing Status   Weight-Bearing Status (Left): weight-bearing as tolerated   Assistive device used: front-wheeled walker    Ambulation: Level Surfaces   Ambulation with assistive device: independent    Ambulation: Stairs   Ascend stairs: independent  Pattern: non-reciprocal  Railings: two rails  Descend stairs: independent  Pattern: non-reciprocal  Railings: two rails  Curbs: requires assist    Observational Gait   Gait: antalgic   Decreased walking speed, stride length, left stance time, left swing time and left step length.   Left foot contact pattern: foot flat  Base of support: increased    Functional Assessment        Comments  Decrease u/l balance on L LE, Fair with a RW use in standing still needed.             Precautions: s/p L TKR (on1/30/25)      Manuals 2/17            PROM/stretching of L knee performed                                                   Neuro Re-Ed                                       Bike/ROM/posture                                                                 Ther Ex             Supine heel slides w strap demo            Short sitting heel slides w strap demo            Long sit HS stretch demo                                                                             Ther Activity             HEP given/reviewed 10'                         Gait Training                                       Modalities

## 2025-02-17 NOTE — LETTER
2025    Boo Box MD  [facility] [address]    Patient: Deidra Brower   YOB: 1955   Date of Visit: 2025     Encounter Diagnosis     ICD-10-CM    1. Presence of artificial knee joint, left  Z96.652       2. S/P TKR (total knee replacement), left  Z96.652       3. Acute pain of left knee  M25.562           Dear Dr. Box:    Thank you for your recent referral of Deidra Brower. Please review the attached evaluation summary from Deidra's recent visit.     Please verify that you agree with the plan of care by signing the attached order.     If you have any questions or concerns, please do not hesitate to call.     I sincerely appreciate the opportunity to share in the care of one of your patients and hope to have another opportunity to work with you in the near future.       Sincerely,    Wallace Figueroa, PT      Referring Provider:      I certify that I have read the below Plan of Care and certify the need for these services furnished under this plan of treatment while under my care.                    Boo Box MD  [address] [place] [id number]  Via Fax: [phone]          PT Evaluation     Today's date: 2025  Patient name: Deidra Brower  : 1955  MRN: [id number]  Referring provider: Boo Box MD  Dx:   Encounter Diagnosis     ICD-10-CM    1. Presence of artificial knee joint, left  Z96.652       2. S/P TKR (total knee replacement), left  Z96.652       3. Acute pain of left knee  M25.562                      Assessment/Plan    Subjective Evaluation    History of Present Illness  Date of surgery: 2025  Mechanism of injury: surgery  Mechanism of injury: Pt is 71 y/o female known to [facility] at , s/p L TKR due to OA on 25.  Pt was receiving home PT p/o and is currently here for [facility] PT L knee evaluation and tx p/o.  Current functional limitations: decreased L knee AROM and mm strength, decreased quality of gait on all  surfaces (using a RW at this time), no position of comfort at night, discomfort with prolong sitting/standing, extra time needed with transfers/bend mobility - extra time needed, (+) pain with movement in L knee.          Not a recurrent problem   Quality of life: poor    Patient Goals  Patient goals for therapy: increased strength, independence with ADLs/IADLs, decreased pain, improved balance, increased motion and return to sport/leisure activities  Patient goal: to be able to walk and to on trips  Pain  Current pain ratin  At best pain rating: 3  At worst pain ratin  Quality: sharp, dull ache and radiating  Relieving factors: change in position, medications and ice  Aggravating factors: sitting, standing, stair climbing and walking  Progression: improved    Social Support  Steps to enter house: no  Stairs in house: yes (1 flight with u/l HR)   Lives in: multiple-level home  Lives with: friends, family.    Employment status: not working  Hand dominance: right      Diagnostic Tests  X-ray: normal (p/o)  Treatments  Previous treatment: physical therapy  Current treatment: home therapy and medication    Objective           Precautions: s/p L TKR ()      Manuals                                                                 Neuro Re-Ed                                                                                                        Ther Ex                                                                                                                     Ther Activity                                       Gait Training                                       Modalities

## 2025-02-18 ENCOUNTER — OFFICE VISIT (OUTPATIENT)
Dept: PHYSICAL THERAPY | Facility: CLINIC | Age: 70
End: 2025-02-18
Payer: MEDICARE

## 2025-02-18 DIAGNOSIS — M25.562 ACUTE PAIN OF LEFT KNEE: ICD-10-CM

## 2025-02-18 DIAGNOSIS — Z96.652 PRESENCE OF ARTIFICIAL KNEE JOINT, LEFT: ICD-10-CM

## 2025-02-18 DIAGNOSIS — Z96.652 S/P TKR (TOTAL KNEE REPLACEMENT), LEFT: Primary | ICD-10-CM

## 2025-02-18 PROCEDURE — 97110 THERAPEUTIC EXERCISES: CPT | Performed by: PHYSICAL THERAPIST

## 2025-02-18 PROCEDURE — 97140 MANUAL THERAPY 1/> REGIONS: CPT | Performed by: PHYSICAL THERAPIST

## 2025-02-21 ENCOUNTER — OFFICE VISIT (OUTPATIENT)
Dept: PHYSICAL THERAPY | Facility: CLINIC | Age: 70
End: 2025-02-21
Payer: MEDICARE

## 2025-02-21 DIAGNOSIS — M25.562 ACUTE PAIN OF LEFT KNEE: ICD-10-CM

## 2025-02-21 DIAGNOSIS — Z96.652 PRESENCE OF ARTIFICIAL KNEE JOINT, LEFT: ICD-10-CM

## 2025-02-21 DIAGNOSIS — Z96.652 S/P TKR (TOTAL KNEE REPLACEMENT), LEFT: Primary | ICD-10-CM

## 2025-02-21 PROCEDURE — 97112 NEUROMUSCULAR REEDUCATION: CPT | Performed by: PHYSICAL THERAPIST

## 2025-02-21 PROCEDURE — 97140 MANUAL THERAPY 1/> REGIONS: CPT | Performed by: PHYSICAL THERAPIST

## 2025-02-21 PROCEDURE — 97110 THERAPEUTIC EXERCISES: CPT | Performed by: PHYSICAL THERAPIST

## 2025-02-21 NOTE — PROGRESS NOTES
"Daily Note     Today's date: 2025  Patient name: Deidra Brwoer  : 1955  MRN: [id number]  Referring provider: Boo Box MD  Dx:   Encounter Diagnosis     ICD-10-CM    1. S/P TKR (total knee replacement), left  Z96.652       2. Acute pain of left knee  M25.562       3. Presence of artificial knee joint, left  Z96.652                      Subjective: (+) SPC presented during tx.      Objective: See treatment diary below      Assessment: Tolerated treatment well. Patient demonstrated fatigue post treatment, exhibited good technique with therapeutic exercises, and would benefit from continued PT for further ROM and stretching tech, gait training, balance. HEP was reviewed. L knee PROM up to 85/90 post tx. Pt is very motivated and compliant with HEP and daily self stretching.      Plan: Continue per plan of care.  Progress treatment as tolerated.       Precautions: s/p L TKR (on25)      Manuals           PROM/stretching of L knee performed SZ SZ          Scar mobs L knee  SZ SZ          L pat mobs  SZ SZ          ROM L knee flexion 70/80 75/85 85/90          Neuro Re-Ed                                       Rec. Bike/ROM/posture  8' ROM 10' ROM                                                              Ther Ex             Supine heel slides w strap demo 10x2, 15\" ea 10x2 15\" ea          Short sitting heel slides w strap demo 10x2 10x2          Wall slides 90/90   10x15\" L LE          Long sit HS stretch demo 5x15\"           Supine w bolster use TKE/DF  10x2, 5\" 10x2, 5\"          Short sitting LE AD's t-ball use  10x2, 5\" 10x2, 5\"          Short sitting LE AB's TB use  10x2, 5\"                                                                            Ther Activity             HEP given/reviewed 10' reviewed                        Gait Training             SPC use on level surface/posture   8'                       Modalities                                              "

## 2025-02-24 ENCOUNTER — OFFICE VISIT (OUTPATIENT)
Dept: PHYSICAL THERAPY | Facility: CLINIC | Age: 70
End: 2025-02-24
Payer: MEDICARE

## 2025-02-24 DIAGNOSIS — M25.562 ACUTE PAIN OF LEFT KNEE: ICD-10-CM

## 2025-02-24 DIAGNOSIS — Z96.652 PRESENCE OF ARTIFICIAL KNEE JOINT, LEFT: ICD-10-CM

## 2025-02-24 DIAGNOSIS — Z96.652 S/P TKR (TOTAL KNEE REPLACEMENT), LEFT: Primary | ICD-10-CM

## 2025-02-24 PROCEDURE — 97110 THERAPEUTIC EXERCISES: CPT | Performed by: PHYSICAL THERAPIST

## 2025-02-24 PROCEDURE — 97140 MANUAL THERAPY 1/> REGIONS: CPT | Performed by: PHYSICAL THERAPIST

## 2025-02-24 NOTE — PROGRESS NOTES
"Daily Note     Today's date: 2025  Patient name: Deidra Brower  : 1955  MRN: [id number]  Referring provider: Boo Box MD  Dx:   Encounter Diagnosis     ICD-10-CM    1. S/P TKR (total knee replacement), left  Z96.652       2. Acute pain of left knee  M25.562       3. Presence of artificial knee joint, left  Z96.652                      Subjective: (+) SPC use for amb and active flexion noted in L knee.      Objective: See treatment diary below      Assessment: Tolerated treatment well. Patient demonstrated fatigue post treatment and would benefit from continued PT for furter stretching, gradual strengthening ex as mary jane. ROM L knee 85/90 flexion.      Plan: Continue per plan of care.  Progress treatment as tolerated.       Precautions: s/p L TKR (on25)      Manuals          PROM/stretching of L knee performed SZ SZ SZ         Scar mobs L knee  SZ SZ SZ         L pat mobs  SZ SZ SZ         ROM L knee flexion 70/80 75/85 85/90 85/90         Neuro Re-Ed                                       Rec. Bike/ROM/posture  8' ROM 10' ROM 10' ROM                                                             Ther Ex             Supine heel slides w strap demo 10x2, 15\" ea 10x2 15\" ea 10x2 15\"ea         Short sitting heel slides w strap demo 10x2 10x2          Wall slides 90/90   10x15\" L LE 10x15\" L LE strap use         Long sit HS stretch demo 5x15\"  10x15\" L LE         Supine w bolster use TKE/DF  10x2, 5\" 10x2, 5\"          Short sitting LE AD's t-ball use  10x2, 5\" 10x2, 5\"          Short sitting LE AB's TB use  10x2, 5\"           Supine bridge    10x2         Supine SLR    L LE 10x2         Step knee flexion/ext self stretch    5x15\" ea dir         Sitting piriformis self stretch    2x15\" ea side                                                Ther Activity             HEP given/reviewed 10' reviewed                        Gait Training             SPC use on level surface/posture   8'     "                   Modalities

## 2025-02-26 ENCOUNTER — OFFICE VISIT (OUTPATIENT)
Dept: PHYSICAL THERAPY | Facility: CLINIC | Age: 70
End: 2025-02-26
Payer: MEDICARE

## 2025-02-26 DIAGNOSIS — Z96.652 S/P TKR (TOTAL KNEE REPLACEMENT), LEFT: Primary | ICD-10-CM

## 2025-02-26 DIAGNOSIS — M25.562 ACUTE PAIN OF LEFT KNEE: ICD-10-CM

## 2025-02-26 DIAGNOSIS — Z96.652 PRESENCE OF ARTIFICIAL KNEE JOINT, LEFT: ICD-10-CM

## 2025-02-26 PROCEDURE — 97140 MANUAL THERAPY 1/> REGIONS: CPT | Performed by: PHYSICAL THERAPIST

## 2025-02-26 PROCEDURE — 97112 NEUROMUSCULAR REEDUCATION: CPT | Performed by: PHYSICAL THERAPIST

## 2025-02-26 PROCEDURE — 97110 THERAPEUTIC EXERCISES: CPT | Performed by: PHYSICAL THERAPIST

## 2025-02-26 NOTE — PROGRESS NOTES
"Daily Note     Today's date: 2025  Patient name: Deidra Brower  : 1955  MRN: [id number]  Referring provider: Boo Box MD  Dx:   Encounter Diagnosis     ICD-10-CM    1. S/P TKR (total knee replacement), left  Z96.652       2. Acute pain of left knee  M25.562       3. Presence of artificial knee joint, left  Z96.652                      Subjective: (+) amb with SPC, (+) antalgic gait      Objective: See treatment diary below      Assessment: Tolerated treatment well. Patient demonstrated fatigue post treatment and would benefit from continued PT for further manual tx to increase L knee flexion, progression of L LE strengthening and improvement in quality of gait on all surfaces. Pt is very motivated and compliant with HEP.      Plan: Continue per plan of care.  Progress treatment as tolerated.       Precautions: s/p L TKR ()      Manuals         PROM/stretching of L knee performed SZ SZ SZ SZ        Scar mobs L knee  SZ SZ SZ SZ        L pat mobs  SZ SZ SZ SZ        ROM L knee flexion 70/80 75/85 85/90 85/90 90/95        Neuro Re-Ed                                       Rec. Bike/ROM/posture  8' ROM 10' ROM 10' ROM 10' ROM                                                            Ther Ex             Supine heel slides w strap demo 10x2, 15\" ea 10x2 15\" ea 10x2 15\"ea         Short sitting heel slides w strap demo 10x2 10x2          Wall slides 90/90   10x15\" L LE 10x15\" L LE strap use 15x15\" L LE        Long sit HS stretch demo 5x15\"  10x15\" L LE         Supine w bolster use TKE/DF  10x2, 5\" 10x2, 5\"          Short sitting LE AD's t-ball use  10x2, 5\" 10x2, 5\"          Short sitting LE AB's TB use  10x2, 5\"           Supine bridge    10x2 W t-ball 10x2, 5\"        Supine SLR    L LE 10x2 L LE 10x2        Step knee flexion/ext self stretch    5x15\" ea dir         Sitting piriformis self stretch    2x15\" ea side         Sitting TKE/HS curls     10x2, 5\" ea dir      " "  S/l L LE AB's     10x2, 5\"                                                            Ther Activity             HEP given/reviewed 10' reviewed                        Gait Training             SPC use on level surface/posture   8'          Stairs negotiation     1 flight step to step, SPC and HR        Modalities                                                  "

## 2025-02-27 ENCOUNTER — OFFICE VISIT (OUTPATIENT)
Dept: PHYSICAL THERAPY | Facility: CLINIC | Age: 70
End: 2025-02-27
Payer: MEDICARE

## 2025-02-27 DIAGNOSIS — Z96.652 S/P TKR (TOTAL KNEE REPLACEMENT), LEFT: Primary | ICD-10-CM

## 2025-02-27 DIAGNOSIS — Z96.652 PRESENCE OF ARTIFICIAL KNEE JOINT, LEFT: ICD-10-CM

## 2025-02-27 DIAGNOSIS — M25.562 ACUTE PAIN OF LEFT KNEE: ICD-10-CM

## 2025-02-27 PROCEDURE — 97140 MANUAL THERAPY 1/> REGIONS: CPT | Performed by: PHYSICAL THERAPIST

## 2025-02-27 PROCEDURE — 97110 THERAPEUTIC EXERCISES: CPT | Performed by: PHYSICAL THERAPIST

## 2025-02-27 NOTE — PROGRESS NOTES
"Daily Note     Today's date: 2025  Patient name: Deidra Brower  : 1955  MRN: [id number]  Referring provider: oBo Box MD  Dx:   Encounter Diagnosis     ICD-10-CM    1. S/P TKR (total knee replacement), left  Z96.652       2. Acute pain of left knee  M25.562       3. Presence of artificial knee joint, left  Z96.652                      Subjective: L knee discomfort today, about 5-8/10 with movement. SPC with amb observed.      Objective: See treatment diary below      Assessment: Tolerated treatment well. Patient demonstrated fatigue post treatment and would benefit from continued PT for further progression of L knee ROM. Today's tx was performed in mary jane range only due to increased pain with L LE movement.      Plan: Continue per plan of care.  Progress treatment as tolerated.       Precautions: s/p L TKR (on25)      Manuals        PROM/stretching of L knee performed SZ SZ SZ SZ SZ       Scar mobs L knee  SZ SZ SZ SZ        L pat mobs  SZ SZ SZ SZ        ROM L knee flexion 70/80 75/85 85/90 85/90 90/95 90       Neuro Re-Ed                                       Rec. Bike/ROM/posture  8' ROM 10' ROM 10' ROM 10' ROM 10' ROM                                                           Ther Ex             Supine heel slides w strap demo 10x2, 15\" ea 10x2 15\" ea 10x2 15\"ea         Short sitting heel slides w strap demo 10x2 10x2          Wall slides 90/90   10x15\" L LE 10x15\" L LE strap use 15x15\" L LE 15x15\" L LE       Long sit HS stretch demo 5x15\"  10x15\" L LE         Supine w bolster use TKE/DF  10x2, 5\" 10x2, 5\"          Short sitting LE AD's t-ball use  10x2, 5\" 10x2, 5\"          Short sitting LE AB's TB use  10x2, 5\"           Supine bridge    10x2 W t-ball 10x2, 5\"        Supine SLR    L LE 10x2 L LE 10x2 #2 L LE 10x2       Step knee flexion/ext self stretch    5x15\" ea dir         Sitting piriformis self stretch    2x15\" ea side         Sitting TKE/HS curls     " "10x2, 5\" ea dir #2 10x2 ea dir       S/l L LE AB's     10x2, 5\" Standing #2 10x2                                                           Ther Activity             HEP given/reviewed 10' reviewed                        Gait Training             SPC use on level surface/posture   8'          Stairs negotiation     1 flight step to step, SPC and HR        Modalities                                                    "

## 2025-03-04 ENCOUNTER — OFFICE VISIT (OUTPATIENT)
Dept: PHYSICAL THERAPY | Facility: CLINIC | Age: 70
End: 2025-03-04
Payer: MEDICARE

## 2025-03-04 DIAGNOSIS — Z96.652 S/P TKR (TOTAL KNEE REPLACEMENT), LEFT: Primary | ICD-10-CM

## 2025-03-04 DIAGNOSIS — Z96.652 PRESENCE OF ARTIFICIAL KNEE JOINT, LEFT: ICD-10-CM

## 2025-03-04 PROCEDURE — 97140 MANUAL THERAPY 1/> REGIONS: CPT | Performed by: PHYSICAL THERAPIST

## 2025-03-04 PROCEDURE — 97110 THERAPEUTIC EXERCISES: CPT | Performed by: PHYSICAL THERAPIST

## 2025-03-04 PROCEDURE — 97112 NEUROMUSCULAR REEDUCATION: CPT | Performed by: PHYSICAL THERAPIST

## 2025-03-04 NOTE — PROGRESS NOTES
"Daily Note     Today's date: 3/4/2025  Patient name: Deidra Brower  : 1955  MRN: [id number]  Referring provider: Boo Box MD  Dx:   Encounter Diagnosis     ICD-10-CM    1. S/P TKR (total knee replacement), left  Z96.652       2. Presence of artificial knee joint, left  Z96.652                      Subjective: Pt reports discomfort with L knee movement, (+) use of pain meds, amb with SPC.      Objective: See treatment diary below      Assessment: Tolerated treatment well. Patient demonstrated fatigue post treatment and would benefit from continued PT for further ROM and stretching tech. Pt is very motivated and compliant with daily HEP. Quality of gait slightly improved with active L knee flexion noted.      Plan: Continue per plan of care.  Progress treatment as tolerated.       Precautions: s/p L TKR ()      Manuals 2/17 2/18 2/21 2/24 2/26 2/27 3/4      PROM/stretching of L knee performed SZ SZ SZ SZ SZ SZ      Scar mobs L knee  SZ SZ SZ SZ        L pat mobs  SZ SZ SZ SZ  SZ      ROM L knee flexion 70/80 75/85 85/90 85/90 90/95 90 90/95      Neuro Re-Ed                                       Rec. Bike/ROM/posture  8' ROM 10' ROM 10' ROM 10' ROM 10' ROM 10' ROM                                                          Ther Ex             Supine heel slides w strap demo 10x2, 15\" ea 10x2 15\" ea 10x2 15\"ea         Short sitting heel slides w strap demo 10x2 10x2          Wall slides 90/90   10x15\" L LE 10x15\" L LE strap use 15x15\" L LE 15x15\" L LE 15x20\" L LE      Long sit HS stretch demo 5x15\"  10x15\" L LE         Supine w bolster use TKE/DF  10x2, 5\" 10x2, 5\"          Short sitting LE AD's t-ball use  10x2, 5\" 10x2, 5\"          Short sitting LE AB's TB use  10x2, 5\"           Supine bridge    10x2 W t-ball 10x2, 5\"        Supine SLR    L LE 10x2 L LE 10x2 #2 L LE 10x2 #2 L LE 12x,3\" ea      Step knee flexion/ext self stretch    5x15\" ea dir         Sitting piriformis self stretch    2x15\" ea " "side         Sitting TKE/HS curls     10x2, 5\" ea dir #2 10x2 ea dir #2 10x2      S/l L LE AB's     10x2, 5\" Standing #2 10x2 Standing #2 10x2, 3\" ea      Standing HS curls       B UE hold, #2 10x      Standing mini squats w t-ball       10x2                                Ther Activity             HEP given/reviewed 10' reviewed                        Gait Training             SPC use on level surface/posture   8'          Step ups ant       6\" 10x B UE use      Stairs negotiation     1 flight step to step, SPC and HR  1 flight step to step, speed S and VC's      Modalities             CP L knee       Applied p tx                                  "

## 2025-03-06 ENCOUNTER — OFFICE VISIT (OUTPATIENT)
Dept: PHYSICAL THERAPY | Facility: CLINIC | Age: 70
End: 2025-03-06
Payer: MEDICARE

## 2025-03-06 DIAGNOSIS — Z96.652 S/P TKR (TOTAL KNEE REPLACEMENT), LEFT: Primary | ICD-10-CM

## 2025-03-06 DIAGNOSIS — Z96.652 PRESENCE OF ARTIFICIAL KNEE JOINT, LEFT: ICD-10-CM

## 2025-03-06 DIAGNOSIS — M25.562 ACUTE PAIN OF LEFT KNEE: ICD-10-CM

## 2025-03-06 PROCEDURE — 97110 THERAPEUTIC EXERCISES: CPT | Performed by: PHYSICAL THERAPIST

## 2025-03-06 PROCEDURE — 97140 MANUAL THERAPY 1/> REGIONS: CPT | Performed by: PHYSICAL THERAPIST

## 2025-03-06 PROCEDURE — 97164 PT RE-EVAL EST PLAN CARE: CPT | Performed by: PHYSICAL THERAPIST

## 2025-03-06 NOTE — LETTER
2025    Boo Box MD  [address] [place] [address]    Patient: Deidra Brower   YOB: 1955   Date of Visit: 3/6/2025     Encounter Diagnosis     ICD-10-CM    1. S/P TKR (total knee replacement), left  Z96.652       2. Presence of artificial knee joint, left  Z96.652       3. Acute pain of left knee  M25.562           Dear Dr. Box:    Thank you for your recent referral of Deidra Brower. Please review the attached evaluation summary from Deidra's recent visit.     Please verify that you agree with the plan of care by signing the attached order.     If you have any questions or concerns, please do not hesitate to call.     I sincerely appreciate the opportunity to share in the care of one of your patients and hope to have another opportunity to work with you in the near future.       Sincerely,    Wallace Figueroa, PT      Referring Provider:      I certify that I have read the below Plan of Care and certify the need for these services furnished under this plan of treatment while under my care.                    Boo Box MD  [address] [place] [address]  Via Fax: [phone]          Daily Note/Progress Report    Today's date: 3/6/2025  Patient name: Deidra Brower  : 1955  MRN: [id number]  Referring provider: Boo Box MD  Dx:   Encounter Diagnosis     ICD-10-CM    1. S/P TKR (total knee replacement), left  Z96.652       2. Presence of artificial knee joint, left  Z96.652       3. Acute pain of left knee  M25.562                      Subjective: (+) use of SPC for amb on all surfaces. On/off L knee pain voiced with movement, poor rest at night. (+) pain meds use.      Objective: See treatment diary below  Pain: 6/10 at this moment, at worst 9/10,  at best 2/10 at rest with meds and ice  Amb: SPC use independently on all surfaces (extra time needed), slightly antalgic gait  HEP: safe and compliant with all  ROM L knee 0-90/95 (in  "sitting/supine)  MMT: L quad/HS 3+/5 in available range    STG's - met goals  1. S with all HEP  2. L knee flexion up to 90 degrees to improve L knee flexibility with ADL's.  3. S with amb using u/l AD for level surface amb.    (LTG's - 6 more weeks to reach goals)  1. Independent and safe with HEP.  2. Independent and safe with amb on all surfaces without AD.  3. Independent with self stretching tech to max L knee AROM at least 110 degrees of flexion.  4. L knee MMT at lest 4/5 t/o to max safe standing ADL's post D/C.     Assessment: Tolerated treatment well. Patient demonstrated fatigue post treatment, exhibited good technique with therapeutic exercises, and would benefit from continued PT. Pt is progressing well in PT towards her LTG's.      Plan: Continue per plan of care.  Progress treatment as tolerated.  6 more weeks of PT tx recommended.     Precautions: s/p L TKR (on1/30/25)      Manuals 2/17 2/18 2/21 2/24 2/26 2/27 3/4 3/6     PROM/stretching of L knee performed SZ SZ SZ SZ SZ SZ SZ     Scar mobs L knee  SZ SZ SZ SZ        L pat mobs  SZ SZ SZ SZ  SZ      ROM L knee flexion 70/80 75/85 85/90 85/90 90/95 90 90/95 90/95     Reassessment        SZ     Neuro Re-Ed                                       Rec. Bike/ROM/posture  8' ROM 10' ROM 10' ROM 10' ROM 10' ROM 10' ROM 10' ROM                                                         Ther Ex             Supine heel slides w strap demo 10x2, 15\" ea 10x2 15\" ea 10x2 15\"ea    15x10\" ea     Short sitting heel slides w strap demo 10x2 10x2     W self OP 10x2     Wall slides 90/90   10x15\" L LE 10x15\" L LE strap use 15x15\" L LE 15x15\" L LE 15x20\" L LE      Long sit HS stretch demo 5x15\"  10x15\" L LE         Supine w bolster use TKE/DF  10x2, 5\" 10x2, 5\"          Short sitting LE AD's t-ball use  10x2, 5\" 10x2, 5\"     10x3, 5\"     Short sitting LE AB's TB use  10x2, 5\"           Supine bridge    10x2 W t-ball 10x2, 5\"        Supine SLR    L LE 10x2 L LE 10x2 #2 L LE " "10x2 #2 L LE 12x,3\" ea      Step knee flexion/ext self stretch    5x15\" ea dir         Sitting piriformis self stretch    2x15\" ea side         Sitting TKE/HS curls     10x2, 5\" ea dir #2 10x2 ea dir #2 10x2      S/l L LE AB's     10x2, 5\" Standing #2 10x2 Standing #2 10x2, 3\" ea      Standing HS curls       B UE hold, #2 10x      Standing mini squats w t-ball       10x2 10x2, 3\" ea     Prone knee flexion self stretch w strap        5x15\" ea                  Ther Activity             HEP given/reviewed 10' reviewed                        Gait Training             SPC use on level surface/posture   8'          Step ups ant       6\" 10x B UE use      Stairs negotiation     1 flight step to step, SPC and HR  1 flight step to step, speed S and VC's      Modalities             CP L knee       Applied p tx                                                    "

## 2025-03-06 NOTE — PROGRESS NOTES
Daily Note/Progress Report    Today's date: 3/6/2025  Patient name: Deidra Brower  : 1955  MRN: [id number]  Referring provider: Boo Box MD  Dx:   Encounter Diagnosis     ICD-10-CM    1. S/P TKR (total knee replacement), left  Z96.652       2. Presence of artificial knee joint, left  Z96.652       3. Acute pain of left knee  M25.562                      Subjective: (+) use of SPC for amb on all surfaces. On/off L knee pain voiced with movement, poor rest at night. (+) pain meds use.      Objective: See treatment diary below  Pain: 6/10 at this moment, at worst 9/10,  at best 2/10 at rest with meds and ice  Amb: SPC use independently on all surfaces (extra time needed), slightly antalgic gait  HEP: safe and compliant with all  ROM L knee 0-90/95 (in sitting/supine)  MMT: L quad/HS 3+/5 in available range    STG's - met goals  1. S with all HEP  2. L knee flexion up to 90 degrees to improve L knee flexibility with ADL's.  3. S with amb using u/l AD for level surface amb.    (LTG's - 6 more weeks to reach goals)  1. Independent and safe with HEP.  2. Independent and safe with amb on all surfaces without AD.  3. Independent with self stretching tech to max L knee AROM at least 110 degrees of flexion.  4. L knee MMT at lest 4/5 t/o to max safe standing ADL's post D/C.     Assessment: Tolerated treatment well. Patient demonstrated fatigue post treatment, exhibited good technique with therapeutic exercises, and would benefit from continued PT. Pt is progressing well in PT towards her LTG's.      Plan: Continue per plan of care.  Progress treatment as tolerated.  6 more weeks of PT tx recommended.     Precautions: s/p L TKR ()      Manuals  3/ 3/6     PROM/stretching of L knee performed SZ SZ SZ SZ SZ SZ SZ     Scar mobs L knee  SZ SZ SZ SZ        L pat mobs  SZ SZ SZ SZ  SZ      ROM L knee flexion 70/80 75/85 85/90 85/90 90/95 90 90/95 90/95     Reassessment        SZ    "  Neuro Re-Ed                                       Rec. Bike/ROM/posture  8' ROM 10' ROM 10' ROM 10' ROM 10' ROM 10' ROM 10' ROM                                                         Ther Ex             Supine heel slides w strap demo 10x2, 15\" ea 10x2 15\" ea 10x2 15\"ea    15x10\" ea     Short sitting heel slides w strap demo 10x2 10x2     W self OP 10x2     Wall slides 90/90   10x15\" L LE 10x15\" L LE strap use 15x15\" L LE 15x15\" L LE 15x20\" L LE      Long sit HS stretch demo 5x15\"  10x15\" L LE         Supine w bolster use TKE/DF  10x2, 5\" 10x2, 5\"          Short sitting LE AD's t-ball use  10x2, 5\" 10x2, 5\"     10x3, 5\"     Short sitting LE AB's TB use  10x2, 5\"           Supine bridge    10x2 W t-ball 10x2, 5\"        Supine SLR    L LE 10x2 L LE 10x2 #2 L LE 10x2 #2 L LE 12x,3\" ea      Step knee flexion/ext self stretch    5x15\" ea dir         Sitting piriformis self stretch    2x15\" ea side         Sitting TKE/HS curls     10x2, 5\" ea dir #2 10x2 ea dir #2 10x2      S/l L LE AB's     10x2, 5\" Standing #2 10x2 Standing #2 10x2, 3\" ea      Standing HS curls       B UE hold, #2 10x      Standing mini squats w t-ball       10x2 10x2, 3\" ea     Prone knee flexion self stretch w strap        5x15\" ea                  Ther Activity             HEP given/reviewed 10' reviewed                        Gait Training             SPC use on level surface/posture   8'          Step ups ant       6\" 10x B UE use      Stairs negotiation     1 flight step to step, SPC and HR  1 flight step to step, speed S and VC's      Modalities             CP L knee       Applied p tx                                    "

## 2025-03-07 ENCOUNTER — OFFICE VISIT (OUTPATIENT)
Dept: PHYSICAL THERAPY | Facility: CLINIC | Age: 70
End: 2025-03-07
Payer: MEDICARE

## 2025-03-07 DIAGNOSIS — Z96.652 S/P TKR (TOTAL KNEE REPLACEMENT), LEFT: Primary | ICD-10-CM

## 2025-03-07 DIAGNOSIS — M25.562 ACUTE PAIN OF LEFT KNEE: ICD-10-CM

## 2025-03-07 DIAGNOSIS — Z96.652 PRESENCE OF ARTIFICIAL KNEE JOINT, LEFT: ICD-10-CM

## 2025-03-07 PROCEDURE — 97140 MANUAL THERAPY 1/> REGIONS: CPT | Performed by: PHYSICAL THERAPIST

## 2025-03-07 PROCEDURE — 97110 THERAPEUTIC EXERCISES: CPT | Performed by: PHYSICAL THERAPIST

## 2025-03-07 NOTE — PROGRESS NOTES
"Daily Note    Today's date: 3/7/2025  Patient name: Deidra Brower  : 1955  MRN: [id number]  Referring provider: Boo Box MD  Dx:   Encounter Diagnosis     ICD-10-CM    1. S/P TKR (total knee replacement), left  Z96.652       2. Presence of artificial knee joint, left  Z96.652       3. Acute pain of left knee  M25.562                      Subjective: No new changes since last tx, No worsening of discomfort. Am with SPC on all surfaces.      Objective: See treatment diary below      Assessment: Tolerated treatment well. Patient demonstrated fatigue post treatment, exhibited good technique with therapeutic exercises, and would benefit from continued PT for further stretching/ROM L knee. No worsening of discomfort post tx.      Plan: Continue per plan of care.  Progress treatment as tolerated.       Precautions: s/p L TKR ()      Manuals 2/17 2/18 2/21 2/24 2/26 2/27 3/4 3/6 3/7    PROM/stretching of L knee performed SZ SZ SZ SZ SZ SZ SZ SZ    Scar mobs L knee  SZ SZ SZ SZ        L pat mobs  SZ SZ SZ SZ  SZ      ROM L knee flexion 70/80 75/85 85/90 85/90 90/95 90 90/95 90/95 90/95    Reassessment        SZ     Neuro Re-Ed                                       Rec. Bike/ROM/posture  8' ROM 10' ROM 10' ROM 10' ROM 10' ROM 10' ROM 10' ROM 10' ROM                                                        Ther Ex             Supine heel slides w strap demo 10x2, 15\" ea 10x2 15\" ea 10x2 15\"ea    15x10\" ea     Short sitting heel slides w strap demo 10x2 10x2     W self OP 10x2     Wall slides 90/90   10x15\" L LE 10x15\" L LE strap use 15x15\" L LE 15x15\" L LE 15x20\" L LE  15x20\" L LE    Long sit HS stretch demo 5x15\"  10x15\" L LE         Supine w bolster use TKE/DF  10x2, 5\" 10x2, 5\"          Short sitting LE AD's t-ball use  10x2, 5\" 10x2, 5\"     10x3, 5\" 10x3, 5\"    Short sitting LE AB's TB use  10x2, 5\"           Supine bridge    10x2 W t-ball 10x2, 5\"        Supine SLR    L LE 10x2 L LE 10x2 #2 L LE 10x2 " "#2 L LE 12x,3\" ea  15x5\" L LE #2    Step knee flexion/ext self stretch    5x15\" ea dir         Sitting piriformis self stretch    2x15\" ea side         Sitting TKE/HS curls     10x2, 5\" ea dir #2 10x2 ea dir #2 10x2      S/l L LE AB's     10x2, 5\" Standing #2 10x2 Standing #2 10x2, 3\" ea  Standing 10x2, 5\"    Standing HS curls       B UE hold, #2 10x      Standing mini squats w t-ball       10x2 10x2, 3\" ea 10x2, 3\" ea    Prone knee flexion self stretch w strap        5x15\" ea                  Ther Activity             HEP given/reviewed 10' reviewed                        Gait Training             SPC use on level surface/posture   8'          Open floor marching, SPC use         20'x4 w rests/posture    Step ups ant       6\" 10x B UE use      Stairs negotiation     1 flight step to step, SPC and HR  1 flight step to step, speed S and VC's      Modalities             CP L knee       Applied p tx                                      "

## 2025-03-11 ENCOUNTER — OFFICE VISIT (OUTPATIENT)
Dept: PHYSICAL THERAPY | Facility: CLINIC | Age: 70
End: 2025-03-11
Payer: MEDICARE

## 2025-03-11 DIAGNOSIS — Z96.652 PRESENCE OF ARTIFICIAL KNEE JOINT, LEFT: ICD-10-CM

## 2025-03-11 DIAGNOSIS — Z96.652 S/P TKR (TOTAL KNEE REPLACEMENT), LEFT: Primary | ICD-10-CM

## 2025-03-11 DIAGNOSIS — M25.562 ACUTE PAIN OF LEFT KNEE: ICD-10-CM

## 2025-03-11 PROCEDURE — 97110 THERAPEUTIC EXERCISES: CPT | Performed by: PHYSICAL THERAPIST

## 2025-03-11 PROCEDURE — 97140 MANUAL THERAPY 1/> REGIONS: CPT | Performed by: PHYSICAL THERAPIST

## 2025-03-11 NOTE — PROGRESS NOTES
"Daily Note     Today's date: 3/11/2025  Patient name: Deidra Brower  : 1955  MRN: [id number]  Referring provider: Boo Box MD  Dx:   Encounter Diagnosis     ICD-10-CM    1. S/P TKR (total knee replacement), left  Z96.652       2. Presence of artificial knee joint, left  Z96.652       3. Acute pain of left knee  M25.562                      Subjective: No new discomfort verbalized, amb with SPC safely.      Objective: See treatment diary below      Assessment: Tolerated treatment well. Patient demonstrated fatigue post treatment, exhibited good technique with therapeutic exercises, and would benefit from continued PT for further L knee ROM/stretching tech and gradual strengthening of LE as mary jane. Improvement in L knee PROM noted up to 100 flexion.      Plan: Continue per plan of care.  Progress treatment as tolerated.       Precautions: s/p L TKR ()      Manuals 2/17 2/18 2/21 2/24 2/26 2/27 3/4 3/6 3/7 3/11   PROM/stretching of L knee performed SZ SZ SZ SZ SZ SZ SZ SZ SZ   Scar mobs L knee  SZ SZ SZ SZ        L pat mobs  SZ SZ SZ SZ  SZ      ROM L knee flexion 70/80 75/85 85/90 85/90 90/95 90 90/95 90/95 90/95 95/100   Reassessment        SZ     Neuro Re-Ed                                       Rec. Bike/ROM/posture  8' ROM 10' ROM 10' ROM 10' ROM 10' ROM 10' ROM 10' ROM 10' ROM 5' ROM, 5' around FF                                                       Ther Ex             Supine heel slides w strap demo 10x2, 15\" ea 10x2 15\" ea 10x2 15\"ea    15x10\" ea     Short sitting heel slides w strap demo 10x2 10x2     W self OP 10x2     Wall slides 90/90   10x15\" L LE 10x15\" L LE strap use 15x15\" L LE 15x15\" L LE 15x20\" L LE  15x20\" L LE 20x20 L LE   Long sit HS stretch demo 5x15\"  10x15\" L LE         Supine w bolster use TKE/DF  10x2, 5\" 10x2, 5\"          Short sitting LE AD's t-ball use  10x2, 5\" 10x2, 5\"     10x3, 5\" 10x3, 5\"    Short sitting LE AB's TB use  10x2, 5\"           Supine bridge    10x2 W " "t-ball 10x2, 5\"     10x2, 5\" w increased L knee flexion   Supine SLR    L LE 10x2 L LE 10x2 #2 L LE 10x2 #2 L LE 12x,3\" ea  15x5\" L LE #2 20x5\" #3 L LE   Step knee flexion/ext self stretch    5x15\" ea dir         Sitting piriformis self stretch    2x15\" ea side         Sitting TKE/HS curls     10x2, 5\" ea dir #2 10x2 ea dir #2 10x2   #3 10x2   S/l L LE AB's     10x2, 5\" Standing #2 10x2 Standing #2 10x2, 3\" ea  Standing 10x2, 5\"    Standing HS curls       B UE hold, #2 10x      Standing mini squats w t-ball       10x2 10x2, 3\" ea 10x2, 3\" ea    Prone knee flexion self stretch w strap        5x15\" ea                  Ther Activity             HEP given/reviewed 10' reviewed                        Gait Training             SPC use on level surface/posture   8'          Open floor marching, SPC use         20'x4 w rests/posture    Step ups ant       6\" 10x B UE use      Stairs negotiation     1 flight step to step, SPC and HR  1 flight step to step, speed S and VC's      Modalities             CP L knee       Applied p tx                                        "

## 2025-03-13 ENCOUNTER — OFFICE VISIT (OUTPATIENT)
Dept: PHYSICAL THERAPY | Facility: CLINIC | Age: 70
End: 2025-03-13
Payer: MEDICARE

## 2025-03-13 DIAGNOSIS — Z96.652 PRESENCE OF ARTIFICIAL KNEE JOINT, LEFT: ICD-10-CM

## 2025-03-13 DIAGNOSIS — Z96.652 S/P TKR (TOTAL KNEE REPLACEMENT), LEFT: Primary | ICD-10-CM

## 2025-03-13 DIAGNOSIS — M25.562 ACUTE PAIN OF LEFT KNEE: ICD-10-CM

## 2025-03-13 PROCEDURE — 97112 NEUROMUSCULAR REEDUCATION: CPT | Performed by: PHYSICAL THERAPIST

## 2025-03-13 PROCEDURE — 97110 THERAPEUTIC EXERCISES: CPT | Performed by: PHYSICAL THERAPIST

## 2025-03-13 NOTE — PROGRESS NOTES
"Daily Note     Today's date: 3/13/2025  Patient name: Deidra Brower  : 1955  MRN: [id number]  Referring provider: Boo Box MD  Dx:   Encounter Diagnosis     ICD-10-CM    1. S/P TKR (total knee replacement), left  Z96.652       2. Presence of artificial knee joint, left  Z96.652       3. Acute pain of left knee  M25.562                      Subjective: Pt reports seeing ortho for a follow up appt. \"They were happy with my progress.\"      Objective: See treatment diary below      Assessment: Tolerated treatment well. Patient demonstrated fatigue post treatment, exhibited good technique with therapeutic exercises, and would benefit from continued PT for further ROM/stretching of L knee as mary jane.      Plan: Continue per plan of care.  Progress treatment as tolerated.       Precautions: s/p L TKR ()      Manuals 2/18 2/21 2/24 2/26 2/27 3/4 3/6 3/7 3/11 3/13   PROM/stretching of L knee SZ SZ SZ SZ SZ SZ SZ SZ SZ SZ   Scar mobs L knee SZ SZ SZ SZ         L pat mobs SZ SZ SZ SZ  SZ       ROM L knee flexion 75/85 85/90 85/90 90/95 90 90/95 90/95 90/95 95/100 95/100   Reassessment       SZ      Neuro Re-Ed                                       Rec. Bike/ROM/posture 8' ROM 10' ROM 10' ROM 10' ROM 10' ROM 10' ROM 10' ROM 10' ROM 5' ROM, 5' around FF On/off ROM and full FF 10'                                                       Ther Ex             Supine heel slides w strap 10x2, 15\" ea 10x2 15\" ea 10x2 15\"ea    15x10\" ea   15x10\"   Short sitting heel slides w strap 10x2 10x2     W self OP 10x2      Wall slides 90/90  10x15\" L LE 10x15\" L LE strap use 15x15\" L LE 15x15\" L LE 15x20\" L LE  15x20\" L LE 20x20 L LE    Long sit HS stretch 5x15\"  10x15\" L LE          Supine w bolster use TKE/DF 10x2, 5\" 10x2, 5\"           Short sitting LE AD's t-ball use 10x2, 5\" 10x2, 5\"     10x3, 5\" 10x3, 5\"     Short sitting LE AB's TB use 10x2, 5\"            Supine bridge   10x2 W t-ball 10x2, 5\"     10x2, 5\" w increased L " "knee flexion 10x2, 5\"   Supine SLR   L LE 10x2 L LE 10x2 #2 L LE 10x2 #2 L LE 12x,3\" ea  15x5\" L LE #2 20x5\" #3 L LE 20x5\" #3 L LE   Step knee flexion/ext self stretch   5x15\" ea dir          Sitting piriformis self stretch   2x15\" ea side          Sitting TKE/HS curls    10x2, 5\" ea dir #2 10x2 ea dir #2 10x2   #3 10x2 #3 10x2   S/l L LE AB's    10x2, 5\" Standing #2 10x2 Standing #2 10x2, 3\" ea  Standing 10x2, 5\"     Standing HS curls      B UE hold, #2 10x       Standing mini squats w t-ball      10x2 10x2, 3\" ea 10x2, 3\" ea     Prone knee flexion self stretch w strap       5x15\" ea                   Ther Activity             HEP given/reviewed reviewed                         Gait Training             SPC use on level surface/posture  8'           Open floor marching, SPC use        20'x4 w rests/posture     Step ups ant      6\" 10x B UE use       Stairs negotiation    1 flight step to step, SPC and HR  1 flight step to step, speed S and VC's       Modalities             CP L knee      Applied p tx                                           "

## 2025-03-14 ENCOUNTER — APPOINTMENT (OUTPATIENT)
Dept: PHYSICAL THERAPY | Facility: CLINIC | Age: 70
End: 2025-03-14
Payer: MEDICARE

## 2025-03-17 ENCOUNTER — OFFICE VISIT (OUTPATIENT)
Dept: PHYSICAL THERAPY | Facility: CLINIC | Age: 70
End: 2025-03-17
Payer: MEDICARE

## 2025-03-17 DIAGNOSIS — Z96.652 PRESENCE OF ARTIFICIAL KNEE JOINT, LEFT: ICD-10-CM

## 2025-03-17 DIAGNOSIS — Z96.652 S/P TKR (TOTAL KNEE REPLACEMENT), LEFT: Primary | ICD-10-CM

## 2025-03-17 DIAGNOSIS — M25.562 ACUTE PAIN OF LEFT KNEE: ICD-10-CM

## 2025-03-17 PROCEDURE — 97110 THERAPEUTIC EXERCISES: CPT | Performed by: PHYSICAL THERAPIST

## 2025-03-17 PROCEDURE — 97112 NEUROMUSCULAR REEDUCATION: CPT | Performed by: PHYSICAL THERAPIST

## 2025-03-17 PROCEDURE — 97140 MANUAL THERAPY 1/> REGIONS: CPT | Performed by: PHYSICAL THERAPIST

## 2025-03-17 NOTE — PROGRESS NOTES
"Daily Note     Today's date: 3/17/2025  Patient name: Deidra Brower  : 1955  MRN: [id number]  Referring provider: Boo Box MD  Dx:   Encounter Diagnosis     ICD-10-CM    1. S/P TKR (total knee replacement), left  Z96.652       2. Presence of artificial knee joint, left  Z96.652       3. Acute pain of left knee  M25.562                      Subjective: Pt c/o discomfort over the past weekend. C/o L lat knee knee edema.      Objective: See treatment diary below      Assessment: Tolerated treatment well. (+) generalized L knee edema noted, no discoloration, no increase core temp. Patient demonstrated fatigue post treatment and would benefit from continued PT for further ROM/stretching. (+) ACE wrap to L knee post tx for edema management. L knee flexion 95/100/105 PROM at the end of the session.      Plan: Continue per plan of care.  Progress treatment as tolerated.       Precautions: s/p L TKR ()      Manuals 2/26 2/27 3/4 3/6 3/7 3/11 3/13 3/17    PROM/stretching of L knee SZ SZ SZ SZ SZ SZ SZ SZ    Scar mobs L knee SZ           L pat mobs SZ  SZ         ROM L knee flexion 90/95 90 90/95 90/95 90/95 95/100 95/100 95/105    Reassessment    SZ        Neuro Re-Ed                                    Rec. Bike/ROM/posture 10' ROM 10' ROM 10' ROM 10' ROM 10' ROM 5' ROM, 5' around FF On/off ROM and full FF 10' 10'                                                    Ther Ex            Supine heel slides w strap    15x10\" ea   15x10\"     Short sitting heel slides w strap    W self OP 10x2    Manual 10x15\" L knee    Wall slides 90/90 15x15\" L LE 15x15\" L LE 15x20\" L LE  15x20\" L LE 20x20 L LE  20x20\" L LE    Long sit HS stretch            Supine w bolster use TKE/DF            Short sitting LE AD's t-ball use    10x3, 5\" 10x3, 5\"       Short sitting LE AB's TB use            Supine bridge W t-ball 10x2, 5\"     10x2, 5\" w increased L knee flexion 10x2, 5\" T-ball 10x3, 5\"    Supine SLR L LE 10x2 #2 L LE 10x2 " "#2 L LE 12x,3\" ea  15x5\" L LE #2 20x5\" #3 L LE 20x5\" #3 L LE     Step knee flexion/ext self stretch            Sitting piriformis self stretch            Sitting TKE/HS curls 10x2, 5\" ea dir #2 10x2 ea dir #2 10x2   #3 10x2 #3 10x2     S/l L LE AB's 10x2, 5\" Standing #2 10x2 Standing #2 10x2, 3\" ea  Standing 10x2, 5\"       Standing HS curls   B UE hold, #2 10x         Standing mini squats w t-ball   10x2 10x2, 3\" ea 10x2, 3\" ea       Prone knee flexion self stretch w strap    5x15\" ea                    Ther Activity            HEP given/reviewed        5' review    Edema control        5'    Gait Training            SPC use on level surface/posture            Open floor marching, SPC use     20'x4 w rests/posture       Step ups ant   6\" 10x B UE use         Stairs negotiation 1 flight step to step, SPC and HR  1 flight step to step, speed S and VC's         Modalities            CP L knee   Applied p tx                                              "

## 2025-03-19 ENCOUNTER — APPOINTMENT (OUTPATIENT)
Dept: PHYSICAL THERAPY | Facility: CLINIC | Age: 70
End: 2025-03-19
Payer: MEDICARE

## 2025-03-21 ENCOUNTER — OFFICE VISIT (OUTPATIENT)
Dept: PHYSICAL THERAPY | Facility: CLINIC | Age: 70
End: 2025-03-21
Payer: MEDICARE

## 2025-03-21 DIAGNOSIS — M25.562 ACUTE PAIN OF LEFT KNEE: ICD-10-CM

## 2025-03-21 DIAGNOSIS — Z96.652 PRESENCE OF ARTIFICIAL KNEE JOINT, LEFT: ICD-10-CM

## 2025-03-21 DIAGNOSIS — Z96.652 S/P TKR (TOTAL KNEE REPLACEMENT), LEFT: Primary | ICD-10-CM

## 2025-03-21 PROCEDURE — 97140 MANUAL THERAPY 1/> REGIONS: CPT | Performed by: PHYSICAL THERAPIST

## 2025-03-21 PROCEDURE — 97112 NEUROMUSCULAR REEDUCATION: CPT | Performed by: PHYSICAL THERAPIST

## 2025-03-21 PROCEDURE — 97110 THERAPEUTIC EXERCISES: CPT | Performed by: PHYSICAL THERAPIST

## 2025-03-21 NOTE — PROGRESS NOTES
"Daily Note     Today's date: 3/21/2025  Patient name: Deidra Brower  : 1955  MRN: [id number]  Referring provider: Boo Box MD  Dx:   Encounter Diagnosis     ICD-10-CM    1. S/P TKR (total knee replacement), left  Z96.652       2. Presence of artificial knee joint, left  Z96.652       3. Acute pain of left knee  M25.562                      Subjective: Pt reports taking steroid meds to control swelling in her L knee and another ortho follow up in 2 weeks.      Objective: See treatment diary below      Assessment: Tolerated treatment well. Patient demonstrated fatigue post treatment and would benefit from continued PT for further ROM/stretching/manual tx to improve L knee flexion and generalized strengthening of L LE. Pt is very motivated and compliant with HEP.      Plan: Continue per plan of care.  Progress treatment as tolerated.       Precautions: s/p L TKR ()      Manuals 2/26 2/27 3/4 3/6 3/7 3/11 3/13 3/17 3/21   PROM/stretching of L knee SZ SZ SZ SZ SZ SZ SZ SZ SZ   Scar mobs L knee SZ           L pat mobs SZ  SZ         ROM L knee flexion 90/95 90 90/95 90/95 90/95 95/100 95/100 95/105 95/100   Reassessment    SZ        Neuro Re-Ed                                    Rec. Bike/ROM/posture 10' ROM 10' ROM 10' ROM 10' ROM 10' ROM 5' ROM, 5' around FF On/off ROM and full FF 10' 10' 10'                                                   Ther Ex            Supine heel slides w strap    15x10\" ea   15x10\"  15x15\"   Short sitting heel slides w strap    W self OP 10x2    Manual 10x15\" L knee Manual OP 10x15 L knee   Wall slides 90/90 15x15\" L LE 15x15\" L LE 15x20\" L LE  15x20\" L LE 20x20 L LE  20x20\" L LE    Long sit HS stretch            Supine w bolster use TKE/DF            Short sitting LE AD's t-ball use    10x3, 5\" 10x3, 5\"       Short sitting LE AB's TB use            Supine bridge W t-ball 10x2, 5\"     10x2, 5\" w increased L knee flexion 10x2, 5\" T-ball 10x3, 5\"    Supine SLR L LE 10x2 " "#2 L LE 10x2 #2 L LE 12x,3\" ea  15x5\" L LE #2 20x5\" #3 L LE 20x5\" #3 L LE  10x3   Step knee flexion/ext self stretch            Sitting piriformis self stretch            Sitting TKE/HS curls 10x2, 5\" ea dir #2 10x2 ea dir #2 10x2   #3 10x2 #3 10x2     S/l L LE AB's 10x2, 5\" Standing #2 10x2 Standing #2 10x2, 3\" ea  Standing 10x2, 5\"       Standing HS curls   B UE hold, #2 10x         Standing mini squats w t-ball   10x2 10x2, 3\" ea 10x2, 3\" ea       Prone knee flexion self stretch w strap    5x15\" ea                    Ther Activity            HEP given/reviewed        5' review    Edema control        5'    Gait Training            SPC use on level surface/posture            Open floor marching, SPC use     20'x4 w rests/posture       Step ups ant   6\" 10x B UE use         Stairs negotiation 1 flight step to step, SPC and HR  1 flight step to step, speed S and VC's         Modalities            CP L knee   Applied p tx                                                "

## 2025-03-24 ENCOUNTER — OFFICE VISIT (OUTPATIENT)
Dept: PHYSICAL THERAPY | Facility: CLINIC | Age: 70
End: 2025-03-24
Payer: MEDICARE

## 2025-03-24 DIAGNOSIS — Z96.652 S/P TKR (TOTAL KNEE REPLACEMENT), LEFT: Primary | ICD-10-CM

## 2025-03-24 DIAGNOSIS — Z96.652 PRESENCE OF ARTIFICIAL KNEE JOINT, LEFT: ICD-10-CM

## 2025-03-24 DIAGNOSIS — M25.562 ACUTE PAIN OF LEFT KNEE: ICD-10-CM

## 2025-03-24 PROCEDURE — 97110 THERAPEUTIC EXERCISES: CPT | Performed by: PHYSICAL THERAPIST

## 2025-03-24 PROCEDURE — 97140 MANUAL THERAPY 1/> REGIONS: CPT | Performed by: PHYSICAL THERAPIST

## 2025-03-24 PROCEDURE — 97112 NEUROMUSCULAR REEDUCATION: CPT | Performed by: PHYSICAL THERAPIST

## 2025-03-24 NOTE — PROGRESS NOTES
"Daily Note     Today's date: 3/24/2025  Patient name: Deidra Brower  : 1955  MRN: [id number]  Referring provider: Boo Box MD  Dx:   Encounter Diagnosis     ICD-10-CM    1. S/P TKR (total knee replacement), left  Z96.652       2. Presence of artificial knee joint, left  Z96.652       3. Acute pain of left knee  M25.562                      Subjective: pt reports stretching multiple times over the past weekend, (+) steroids, (+) ice use      Objective: See treatment diary below      Assessment: Tolerated treatment well. Patient demonstrated fatigue post treatment, exhibited good technique with therapeutic exercises, and would benefit from continued PT. Improvement in AROM in L knee noted, flexion up to 110 degree and better quality of gait without AD also noted. Pt is very motivate and compliant with daily HEP.      Plan: Continue per plan of care.  Progress treatment as tolerated.       Precautions: s/p L TKR ()      Manuals 3/4 3/6 3/7 3/11 3/13 3/17 3/21 3/24    PROM/stretching of L knee SZ SZ SZ SZ SZ SZ SZ SZ    Scar mobs L knee                                    L pat mobs SZ           ROM L knee flexion 90/95 90/95 90/95 95/100 95/100 95/105 95/100 100/110    Reassessment  SZ          Neuro Re-Ed                                    Rec. Bike/ROM/posture 10' ROM 10' ROM 10' ROM 5' ROM, 5' around FF On/off ROM and full FF 10' 10' 10' 10'                                                    Ther Ex            Supine heel slides w strap  15x10\" ea   15x10\"  15x15\" 15x20\"    Short sitting heel slides w strap  W self OP 10x2    Manual 10x15\" L knee Manual OP 10x15 L knee 15x20\" ea    Wall slides 90/90 15x20\" L LE  15x20\" L LE 20x20 L LE  20x20\" L LE  #4 10x20\" ea    Long sit HS stretch            Supine w bolster use TKE/DF            Short sitting LE AD's t-ball use  10x3, 5\" 10x3, 5\"         Short sitting LE AB's TB use            Supine bridge    10x2, 5\" w increased L knee flexion 10x2, 5\" " "T-ball 10x3, 5\"      Supine SLR #2 L LE 12x,3\" ea  15x5\" L LE #2 20x5\" #3 L LE 20x5\" #3 L LE  10x3     Step knee flexion/ext self stretch            Sitting piriformis self stretch            Sitting TKE/HS curls #2 10x2   #3 10x2 #3 10x2       S/l L LE AB's Standing #2 10x2, 3\" ea  Standing 10x2, 5\"         Standing HS curls B UE hold, #2 10x           Standing mini squats w t-ball 10x2 10x2, 3\" ea 10x2, 3\" ea         Prone knee flexion self stretch w strap  5x15\" ea          Leg press b LE self stretch                    L4 10x15\"                Ther Activity            HEP given/reviewed      5' review      Edema control      5'      Gait Training            SPC use on level surface/posture            Open floor marching, SPC use   20'x4 w rests/posture     8' no cane/posture/speed    Step ups ant 6\" 10x B UE use           Stairs negotiation 1 flight step to step, speed S and VC's           Modalities            CP L knee Applied p tx                                                    "

## 2025-03-26 ENCOUNTER — OFFICE VISIT (OUTPATIENT)
Dept: PHYSICAL THERAPY | Facility: CLINIC | Age: 70
End: 2025-03-26
Payer: MEDICARE

## 2025-03-26 DIAGNOSIS — Z96.652 S/P TKR (TOTAL KNEE REPLACEMENT), LEFT: Primary | ICD-10-CM

## 2025-03-26 DIAGNOSIS — Z96.652 PRESENCE OF ARTIFICIAL KNEE JOINT, LEFT: ICD-10-CM

## 2025-03-26 DIAGNOSIS — M25.562 ACUTE PAIN OF LEFT KNEE: ICD-10-CM

## 2025-03-26 PROCEDURE — 97110 THERAPEUTIC EXERCISES: CPT | Performed by: PHYSICAL THERAPIST

## 2025-03-26 PROCEDURE — 97112 NEUROMUSCULAR REEDUCATION: CPT | Performed by: PHYSICAL THERAPIST

## 2025-03-26 PROCEDURE — 97140 MANUAL THERAPY 1/> REGIONS: CPT | Performed by: PHYSICAL THERAPIST

## 2025-03-26 NOTE — PROGRESS NOTES
"Daily Note     Today's date: 3/26/2025  Patient name: Deidra Brower  : 1955  MRN: [id number]  Referring provider: Boo Box MD  Dx:   Encounter Diagnosis     ICD-10-CM    1. S/P TKR (total knee replacement), left  Z96.652       2. Presence of artificial knee joint, left  Z96.652       3. Acute pain of left knee  M25.562                      Subjective: Pt reports self stretching at home multiple times per day, (+) gym bike and total gym use at home as well.      Objective: See treatment diary below      Assessment: Tolerated treatment well. Patient demonstrated fatigue post treatment, exhibited good technique with therapeutic exercises, and would benefit from continued PT. Pt is very motivated and compliant with daily HEP/self stretching tech.      Plan: Continue per plan of care.  Progress treatment as tolerated.       Precautions: s/p L TKR ()      Manuals 3/11 3/13 3/17 3/21 3/24 3/26      PROM/stretching of L knee SZ SZ SZ SZ SZ SZ      Scar mobs L knee                                    L pat mobs            ROM L knee flexion 95/100 95/100 95/105 95/100 100/110 100/110 (+)      Reassessment            Neuro Re-Ed                                    Rec. Bike/ROM/posture 5' ROM, 5' around FF On/off ROM and full FF 10' 10' 10' 10' 10'                                                      Ther Ex            Supine heel slides w strap  15x10\"  15x15\" 15x20\"       Short sitting heel slides w strap   Manual 10x15\" L knee Manual OP 10x15 L knee 15x20\" ea       Wall slides 90/90 20x20 L LE  20x20\" L LE  #4 10x20\" ea       Long sit HS stretch            Supine w bolster use TKE/DF            Short sitting LE AD's t-ball use            Short sitting LE AB's TB use            Supine bridge 10x2, 5\" w increased L knee flexion 10x2, 5\" T-ball 10x3, 5\"         Supine SLR 20x5\" #3 L LE 20x5\" #3 L LE  10x3        Step knee flexion/ext self stretch            Sitting piriformis self stretch          " "  Sitting TKE/HS curls #3 10x2 #3 10x2          S/l L LE AB's            Standing HS curls            Standing mini squats w t-ball            Prone knee flexion self stretch w strap            Leg press b LE self stretch     L4 10x15\" L4 10x15\"      Step ups FF      10\" 10x2 R UE hold      Step ups lat      10\" 10x2 B UE hold      FF step self stretch      5x15\"       Deep squat self stretch      B UE hold 5x10\"                                          Ther Activity            HEP given/reviewed   5' review         Edema control   5'         Gait Training            SPC use on level surface/posture            Open floor marching, SPC use     8' no cane/posture/speed       Step ups ant            Stairs negotiation      2 flights wtep over B UE hold      Modalities            CP L knee                                                       "

## 2025-03-28 ENCOUNTER — OFFICE VISIT (OUTPATIENT)
Dept: PHYSICAL THERAPY | Facility: CLINIC | Age: 70
End: 2025-03-28
Payer: MEDICARE

## 2025-03-28 DIAGNOSIS — Z96.652 S/P TKR (TOTAL KNEE REPLACEMENT), LEFT: Primary | ICD-10-CM

## 2025-03-28 DIAGNOSIS — M25.562 ACUTE PAIN OF LEFT KNEE: ICD-10-CM

## 2025-03-28 DIAGNOSIS — Z96.652 PRESENCE OF ARTIFICIAL KNEE JOINT, LEFT: ICD-10-CM

## 2025-03-28 PROCEDURE — 97140 MANUAL THERAPY 1/> REGIONS: CPT | Performed by: PHYSICAL THERAPIST

## 2025-03-28 PROCEDURE — 97112 NEUROMUSCULAR REEDUCATION: CPT | Performed by: PHYSICAL THERAPIST

## 2025-03-28 PROCEDURE — 97110 THERAPEUTIC EXERCISES: CPT | Performed by: PHYSICAL THERAPIST

## 2025-03-28 NOTE — PROGRESS NOTES
"Daily Note     Today's date: 3/28/2025  Patient name: Deidra Brower  : 1955  MRN: [id number]  Referring provider: Boo Box MD  Dx:   Encounter Diagnosis     ICD-10-CM    1. S/P TKR (total knee replacement), left  Z96.652       2. Presence of artificial knee joint, left  Z96.652       3. Acute pain of left knee  M25.562                      Subjective: No new changes since last tx.      Objective: See treatment diary below      Assessment: Tolerated treatment well. Patient demonstrated fatigue post treatment, exhibited good technique with therapeutic exercises, and would benefit from continued PT.      Plan: Continue per plan of care.  Progress treatment as tolerated.  Re-assessment next tx session.     Precautions: s/p L TKR ()      Manuals 3/11 3/13 3/17 3/21 3/24 3/26 3/28     PROM/stretching of L knee SZ SZ SZ SZ SZ SZ SZ     Scar mobs L knee                                    L pat mobs            ROM L knee flexion 95/100 95/100 95/105 95/100 100/110 100/110 (+) 105/1110     Reassessment            Neuro Re-Ed                                    Rec. Bike/ROM/posture 5' ROM, 5' around FF On/off ROM and full FF 10' 10' 10' 10' 10' 10'                                                     Ther Ex            Supine heel slides w strap  15x10\"  15x15\" 15x20\"  #4 10x20\"     Short sitting heel slides w strap   Manual 10x15\" L knee Manual OP 10x15 L knee 15x20\" ea       Wall slides 90/90 20x20 L LE  20x20\" L LE  #4 10x20\" ea       Long sit HS stretch            Supine w bolster use TKE/DF            Short sitting LE AD's t-ball use            Short sitting LE AB's TB use            Supine bridge 10x2, 5\" w increased L knee flexion 10x2, 5\" T-ball 10x3, 5\"         Supine SLR 20x5\" #3 L LE 20x5\" #3 L LE  10x3        Step knee flexion/ext self stretch            Sitting piriformis self stretch            Sitting TKE/HS curls #3 10x2 #3 10x2          S/l L LE AB's            Standing HS curls          " "  Standing mini squats w t-ball            Prone knee flexion self stretch w strap            Leg press b LE self stretch     L4 10x15\" L4 10x15\"      Step ups FF      10\" 10x2 R UE hold      Step ups lat      10\" 10x2 B UE hold      FF step self stretch      5x15\"  5x15\"     Deep squat self stretch      B UE hold 5x10\" B UE hold 5x15\"                                         Ther Activity            HEP given/reviewed   5' review         Edema control   5'         Gait Training            SPC use on level surface/posture            Open floor marching, SPC use     8' no cane/posture/speed       Step ups ant            Stairs negotiation      2 flights step over B UE hold 2 flights step over, B UE hold     Modalities            CP L knee                                                         "

## 2025-03-31 ENCOUNTER — APPOINTMENT (OUTPATIENT)
Dept: PHYSICAL THERAPY | Facility: CLINIC | Age: 70
End: 2025-03-31
Payer: MEDICARE

## 2025-04-02 ENCOUNTER — EVALUATION (OUTPATIENT)
Dept: PHYSICAL THERAPY | Facility: CLINIC | Age: 70
End: 2025-04-02
Payer: MEDICARE

## 2025-04-02 DIAGNOSIS — Z96.652 S/P TKR (TOTAL KNEE REPLACEMENT), LEFT: Primary | ICD-10-CM

## 2025-04-02 DIAGNOSIS — M25.562 ACUTE PAIN OF LEFT KNEE: ICD-10-CM

## 2025-04-02 DIAGNOSIS — Z96.652 PRESENCE OF ARTIFICIAL KNEE JOINT, LEFT: ICD-10-CM

## 2025-04-02 PROCEDURE — 97164 PT RE-EVAL EST PLAN CARE: CPT | Performed by: PHYSICAL THERAPIST

## 2025-04-02 PROCEDURE — 97110 THERAPEUTIC EXERCISES: CPT | Performed by: PHYSICAL THERAPIST

## 2025-04-02 PROCEDURE — 97112 NEUROMUSCULAR REEDUCATION: CPT | Performed by: PHYSICAL THERAPIST

## 2025-04-02 NOTE — LETTER
April 2, 2025    Boo Box MD  [address] [facility] [address]    Patient: Deidra Brower   YOB: 1955   Date of Visit: 4/2/2025     Encounter Diagnosis     ICD-10-CM    1. S/P TKR (total knee replacement), left  Z96.652       2. Presence of artificial knee joint, left  Z96.652       3. Acute pain of left knee  M25.562           Dear Dr. Box:    Thank you for your recent referral of Deidra Brower. Please review the attached evaluation summary from Deidra's recent visit.     Please verify that you agree with the plan of care by signing the attached order.     If you have any questions or concerns, please do not hesitate to call.     I sincerely appreciate the opportunity to share in the care of one of your patients and hope to have another opportunity to work with you in the near future.       Sincerely,    Wallace Figueroa, PT      Referring Provider:      I certify that I have read the below Plan of Care and certify the need for these services furnished under this plan of treatment while under my care.                    Boo Box MD  [address] [facility] [address]  Via Fax: [phone]          No notes on file

## 2025-04-02 NOTE — PROGRESS NOTES
"Daily Note/Progress Report    Today's date: 2025  Patient name: Deidra Brower  : 1955  MRN: [id number]  Referring provider: Boo Box MD  Dx:   Encounter Diagnosis     ICD-10-CM    1. S/P TKR (total knee replacement), left  Z96.652       2. Presence of artificial knee joint, left  Z96.652       3. Acute pain of left knee  M25.562                      Subjective: Amb onto OPD PT without AD.       Objective: See treatment diary below  Pain: 2/10 today   ROM: 105/110 L knee  Amb with AD on level surface, SPC use on incline.  Stairs: up/down 1 flight with u/l UE hod step over step  MMT L knee: flex and ext at lest 4/5 t/o in available range.  HEP: safe and independent with all ex tech, motivated and compliant with all    (LTG's - 2 more weeks to reach goals)  1. Independent and safe with HEP.  2. Independent and safe with amb on all surfaces without AD.  3. Independent with self stretching tech to max L knee AROM at least 115 degrees of flexion.  4. L knee MMT at lest 4+/5 t/o to max safe standing ADL's post D/C.    Assessment: Tolerated treatment well. Patient demonstrated fatigue post treatment, exhibited good technique with therapeutic exercises, and would benefit from continued PT as per current POC. Ortho MD's visit later today.      Plan: Continue per plan of care.  Progress treatment as tolerated.       Precautions: s/p L TKR ()      Manuals 3/11 3/13 3/17 3/21 3/24 3/26 3/28 4/    PROM/stretching of L knee SZ SZ SZ SZ SZ SZ SZ SZ    Scar mobs L knee                                    L pat mobs            ROM L knee flexion 95/100 95/100 95/105 95/100 100/110 100/110 (+) 105/1110 105/110    Reassessment        SZ    Neuro Re-Ed                                    Rec. Bike/ROM/posture 5' ROM, 5' around FF On/off ROM and full FF 10' 10' 10' 10' 10' 10' Upright L1 10'                                                    Ther Ex            Supine heel slides w strap  15x10\"  15x15\" 15x20\"  " "#4 10x20\" #5 10x2    Short sitting heel slides w strap   Manual 10x15\" L knee Manual OP 10x15 L knee 15x20\" ea       Wall slides 90/90 20x20 L LE  20x20\" L LE  #4 10x20\" ea       Long sit HS stretch            Supine w bolster use TKE/DF            Short sitting LE AD's t-ball use            Short sitting LE AB's TB use            Supine bridge 10x2, 5\" w increased L knee flexion 10x2, 5\" T-ball 10x3, 5\"         Supine SLR 20x5\" #3 L LE 20x5\" #3 L LE  10x3    #5 10x2    Sit to stand        No UE use 10x2    Step knee flexion/ext self stretch            Sitting piriformis self stretch            Sitting TKE/HS curls #3 10x2 #3 10x2      #5 10x2    S/l L LE AB's            Standing HS curls            Standing mini squats w t-ball            Prone knee flexion self stretch w strap            Leg press b LE self stretch     L4 10x15\" L4 10x15\"      Step ups FF      10\" 10x2 R UE hold      Step ups lat      10\" 10x2 B UE hold      FF step self stretch      5x15\"  5x15\"     Deep squat self stretch      B UE hold 5x10\" B UE hold 5x15\"                                         Ther Activity            HEP given/reviewed   5' review         Edema control   5'         Gait Training            SPC use on level surface/posture            Open floor marching, SPC use     8' no cane/posture/speed       Step ups ant            Stairs negotiation      2 flights step over B UE hold 2 flights step over, B UE hold     Modalities            CP L knee                                                           "

## 2025-04-04 ENCOUNTER — OFFICE VISIT (OUTPATIENT)
Dept: PHYSICAL THERAPY | Facility: CLINIC | Age: 70
End: 2025-04-04
Payer: MEDICARE

## 2025-04-04 DIAGNOSIS — Z96.652 PRESENCE OF ARTIFICIAL KNEE JOINT, LEFT: ICD-10-CM

## 2025-04-04 DIAGNOSIS — M25.562 ACUTE PAIN OF LEFT KNEE: ICD-10-CM

## 2025-04-04 DIAGNOSIS — Z96.652 S/P TKR (TOTAL KNEE REPLACEMENT), LEFT: Primary | ICD-10-CM

## 2025-04-04 PROCEDURE — 97112 NEUROMUSCULAR REEDUCATION: CPT | Performed by: PHYSICAL THERAPIST

## 2025-04-04 PROCEDURE — 97110 THERAPEUTIC EXERCISES: CPT | Performed by: PHYSICAL THERAPIST

## 2025-04-04 NOTE — PROGRESS NOTES
"Daily Note     Today's date: 2025  Patient name: Deidra Brower  : 1955  MRN: [id number]  Referring provider: Boo Box MD  Dx:   Encounter Diagnosis     ICD-10-CM    1. S/P TKR (total knee replacement), left  Z96.652       2. Presence of artificial knee joint, left  Z96.652       3. Acute pain of left knee  M25.562                      Subjective: Amb without AD, good postural awareness. Went to see ortho, recommended continuations of PT, happy about the results.      Objective: See treatment diary below      Assessment: Tolerated treatment well. Patient demonstrated fatigue post treatment, exhibited good technique with therapeutic exercises, and would benefit from continued PT for further progression of ROM/stretching and generalized strengthening of L LE as mary jane.      Plan: Continue per plan of care.  Progress treatment as tolerated.       Precautions: s/p L TKR ()      Manuals 3/11 3/13 3/17 3/21 3/24 3/26 3/28 4/2 4/4   PROM/stretching of L knee SZ SZ SZ SZ SZ SZ SZ SZ SZ   Scar mobs L knee                                    L pat mobs            ROM L knee flexion 95/100 95/100 95/105 95/100 100/110 100/110 (+) 105/1110 105/110 105/110   Reassessment        SZ    Neuro Re-Ed                                    Rec. Bike/ROM/posture 5' ROM, 5' around FF On/off ROM and full FF 10' 10' 10' 10' 10' 10' Upright L1 10' L2 10' full FF                                                   Ther Ex            Supine heel slides w strap  15x10\"  15x15\" 15x20\"  #4 10x20\" #5 10x2 #5 10x2   Supine bridge 10x2, 5\" w increased L knee flexion 10x2, 5\" T-ball 10x3, 5\"      10x2, 8\" ea   Supine SLR 20x5\" #3 L LE 20x5\" #3 L LE  10x3    #5 10x2 #5 10x3   Sit to stand        No UE use 10x2 No UE use 10x2   Sitting TKE/HS curls #3 10x2 #3 10x2      #5 10x2 #5 10x2   Standing mini squats w t-ball         10x2, 5\" ea   Standing marching         #5 10x3   HS culrs         #5 10x2   Leg press b LE self stretch     L4 " "10x15\" L4 10x15\"      Step ups FF      10\" 10x2 R UE hold      Step ups lat      10\" 10x2 B UE hold      FF step self stretch      5x15\"  5x15\"     Deep squat self stretch      B UE hold 5x10\" B UE hold 5x15\"                                         Ther Activity            HEP given/reviewed   5' review         Edema control   5'         Gait Training            SPC use on level surface/posture            Open floor marching, SPC use     8' no cane/posture/speed       Step ups ant            Stairs negotiation      2 flights step over B UE hold 2 flights step over, B UE hold     Modalities            CP L knee                                                             "

## 2025-04-08 ENCOUNTER — OFFICE VISIT (OUTPATIENT)
Dept: PHYSICAL THERAPY | Facility: CLINIC | Age: 70
End: 2025-04-08
Payer: MEDICARE

## 2025-04-08 DIAGNOSIS — Z96.652 PRESENCE OF ARTIFICIAL KNEE JOINT, LEFT: ICD-10-CM

## 2025-04-08 DIAGNOSIS — Z96.652 S/P TKR (TOTAL KNEE REPLACEMENT), LEFT: Primary | ICD-10-CM

## 2025-04-08 DIAGNOSIS — M25.562 ACUTE PAIN OF LEFT KNEE: ICD-10-CM

## 2025-04-08 PROCEDURE — 97110 THERAPEUTIC EXERCISES: CPT | Performed by: PHYSICAL THERAPIST

## 2025-04-08 PROCEDURE — 97112 NEUROMUSCULAR REEDUCATION: CPT | Performed by: PHYSICAL THERAPIST

## 2025-04-08 NOTE — PROGRESS NOTES
"Daily Note     Today's date: 2025  Patient name: Deidra Brower  : 1955  MRN: [id number]  Referring provider: Boo Box MD  Dx:   Encounter Diagnosis     ICD-10-CM    1. S/P TKR (total knee replacement), left  Z96.652       2. Presence of artificial knee joint, left  Z96.652       3. Acute pain of left knee  M25.562                      Subjective: No pain prior to tx, amb without SPC. \"I drove myself in today.\"      Objective: See treatment diary below      Assessment: Tolerated treatment well. Patient demonstrated fatigue post treatment, exhibited good technique with therapeutic exercises, and would benefit from continued PT. Pt is showing gradual improvement in L knee AROM      Plan: Continue per plan of care.  Progress treatment as tolerated.       Precautions: s/p L TKR ()      Manuals 3/17 3/21 3/24 3/26 3/28 4/2 4/4 4/8     PROM/stretching of L knee SZ SZ SZ SZ SZ SZ SZ      Scar mobs L knee                                                    L pat mobs             ROM L knee flexion 95/105 95/100 100/110 100/110 (+) 105/1110 105/110 105/110 115/120     Reassessment      SZ       Neuro Re-Ed                                       Rec. Bike/ROM/posture 10' 10' 10' 10' 10' Upright L1 10' L2 10' full FF L2 10' full                                                         Ther Ex             Supine heel slides w strap  15x15\" 15x20\"  #4 10x20\" #5 10x2 #5 10x2      Supine bridge T-ball 10x3, 5\"      10x2, 8\" ea      Supine SLR  10x3    #5 10x2 #5 10x3      Sit to stand      No UE use 10x2 No UE use 10x2 No UE 10x3     Sitting TKE/HS curls      #5 10x2 #5 10x2      Standing mini squats w t-ball       10x2, 5\" ea      Standing marching       #5 10x3      HS culrs       #5 10x2      Leg press b LE self stretch   L4 10x15\" L4 10x15\"         Step ups FF    10\" 10x2 R UE hold    10\" 10x2 R UE use     Step ups lat    10\" 10x2 B UE hold    10\" 10x2 R UE use     Step over step        6\" L LE on the " "step, u/l UE hold 10x2     FF step self stretch    5x15\"  5x15\"        Deep squat self stretch    B UE hold 5x10\" B UE hold 5x15\"   B UE hold 5x20\" ea                                            Ther Activity             HEP given/reviewed 5' review            Edema control 5'            Gait Training             SPC use on level surface/posture             Open floor marching, SPC use   8' no cane/posture/speed     5' speed, no AD     Step ups ant             Stairs negotiation    2 flights step over B UE hold 2 flights step over, B UE hold   2 flights w u/l UE use, step over step/speed     Modalities             CP L knee                                                                 "

## 2025-04-11 ENCOUNTER — OFFICE VISIT (OUTPATIENT)
Dept: PHYSICAL THERAPY | Facility: CLINIC | Age: 70
End: 2025-04-11
Payer: MEDICARE

## 2025-04-11 DIAGNOSIS — M25.562 ACUTE PAIN OF LEFT KNEE: ICD-10-CM

## 2025-04-11 DIAGNOSIS — Z96.652 PRESENCE OF ARTIFICIAL KNEE JOINT, LEFT: ICD-10-CM

## 2025-04-11 DIAGNOSIS — Z96.652 S/P TKR (TOTAL KNEE REPLACEMENT), LEFT: Primary | ICD-10-CM

## 2025-04-11 PROCEDURE — 97110 THERAPEUTIC EXERCISES: CPT | Performed by: PHYSICAL THERAPIST

## 2025-04-11 PROCEDURE — 97112 NEUROMUSCULAR REEDUCATION: CPT | Performed by: PHYSICAL THERAPIST

## 2025-04-11 NOTE — PROGRESS NOTES
"Daily Note     Today's date: 2025  Patient name: Deidra Brower  : 1955  MRN: [id number]  Referring provider: Boo Bxo MD  Dx:   Encounter Diagnosis     ICD-10-CM    1. S/P TKR (total knee replacement), left  Z96.652       2. Presence of artificial knee joint, left  Z96.652       3. Acute pain of left knee  M25.562                      Subjective: No new discomfort.      Objective: See treatment diary below      Assessment: Tolerated treatment well. Patient exhibited good technique with therapeutic exercises and would benefit from continued PT for further LE therex/gait and balance training. No pain post .      Plan: Continue per plan of care.  Progress treatment as tolerated.       Precautions: s/p L TKR ()      Manuals 3/17 3/21 3/24 3/26 3/28 4/2 4/4 4/8 4/11    PROM/stretching of L knee SZ SZ SZ SZ SZ SZ SZ  self    Scar mobs L knee                                                    L pat mobs             ROM L knee flexion 95/105 95/100 100/110 100/110 (+) 105/1110 105/110 105/110 115/120 120    Reassessment      SZ       Neuro Re-Ed                                       Rec. Bike/ROM/posture 10' 10' 10' 10' 10' Upright L1 10' L2 10' full FF L2 10' full L3 10' full                                                        Ther Ex             Supine heel slides w strap  15x15\" 15x20\"  #4 10x20\" #5 10x2 #5 10x2      Supine bridge T-ball 10x3, 5\"      10x2, 8\" ea      Supine SLR  10x3    #5 10x2 #5 10x3      Sit to stand      No UE use 10x2 No UE use 10x2 No UE 10x3 No UE 10x3    Sitting TKE/HS curls      #5 10x2 #5 10x2      Standing mini squats w t-ball       10x2, 5\" ea      Standing marching       #5 10x3      HS culrs       #5 10x2      Leg press b LE self stretch   L4 10x15\" L4 10x15\"     L4 15x20\" ea    Step ups FF    10\" 10x2 R UE hold    10\" 10x2 R UE use 10\" 10x2 5\" R UE use    Step ups lat    10\" 10x2 B UE hold    10\" 10x2 R UE use     Step over step        6\" L LE on the step, " "u/l UE hold 10x2     FF step self stretch    5x15\"  5x15\"    5x20\"    Deep squat self stretch    B UE hold 5x10\" B UE hold 5x15\"   B UE hold 5x20\" ea B UE hold 5x30\" ea                                           Ther Activity             HEP given/reviewed 5' review            Edema control 5'            Gait Training             SPC use on level surface/posture             Open floor marching, SPC use   8' no cane/posture/speed     5' speed, no AD 5' speed 150'x2    Step ups ant             Stairs negotiation    2 flights step over B UE hold 2 flights step over, B UE hold   2 flights w u/l UE use, step over step/speed 2 flights w u/l UE hold/speed/posture step over    Modalities             CP L knee                                                                   "

## 2025-04-15 ENCOUNTER — OFFICE VISIT (OUTPATIENT)
Dept: PHYSICAL THERAPY | Facility: CLINIC | Age: 70
End: 2025-04-15
Payer: MEDICARE

## 2025-04-15 DIAGNOSIS — M25.562 ACUTE PAIN OF LEFT KNEE: ICD-10-CM

## 2025-04-15 DIAGNOSIS — Z96.652 S/P TKR (TOTAL KNEE REPLACEMENT), LEFT: Primary | ICD-10-CM

## 2025-04-15 DIAGNOSIS — Z96.652 PRESENCE OF ARTIFICIAL KNEE JOINT, LEFT: ICD-10-CM

## 2025-04-15 PROCEDURE — 97110 THERAPEUTIC EXERCISES: CPT | Performed by: PHYSICAL THERAPIST

## 2025-04-15 PROCEDURE — 97112 NEUROMUSCULAR REEDUCATION: CPT | Performed by: PHYSICAL THERAPIST

## 2025-04-15 NOTE — PROGRESS NOTES
"Daily Note     Today's date: 4/15/2025  Patient name: Deidra Brower  : 1955  MRN: [id number]  Referring provider: Boo Box MD  Dx:   Encounter Diagnosis     ICD-10-CM    1. S/P TKR (total knee replacement), left  Z96.652       2. Presence of artificial knee joint, left  Z96.652       3. Acute pain of left knee  M25.562                      Subjective: No pain prior to tx voiced.      Objective: See treatment diary below      Assessment: Tolerated treatment well. Patient exhibited good technique with therapeutic exercises. Pt is safe with all ex tech. No pain worsening post tx. Amb without AD on all surfaces. 1 more review tx and D/C with HEP.      Plan: Continue per plan of care.  Potential discharge next visit.     Precautions: s/p L TKR ()      Manuals 3/17 3/21 3/24 3/26 3/28 4/2 4/4 4/8 4/11 4/15    PROM/stretching of L knee SZ SZ SZ SZ SZ SZ SZ  self self    Scar mobs L knee                                                        L pat mobs              ROM L knee flexion 95/105 95/100 100/110 100/110 (+) 105/1110 105/110 105/110 115/120 120 120    Reassessment      SZ        Neuro Re-Ed                                          Rec. Bike/ROM/posture 10' 10' 10' 10' 10' Upright L1 10' L2 10' full FF L2 10' full L3 10' full 10' full                                                            Ther Ex              Supine heel slides w strap  15x15\" 15x20\"  #4 10x20\" #5 10x2 #5 10x2       Supine bridge T-ball 10x3, 5\"      10x2, 8\" ea       Supine SLR  10x3    #5 10x2 #5 10x3       Sit to stand      No UE use 10x2 No UE use 10x2 No UE 10x3 No UE 10x3     Sitting TKE/HS curls      #5 10x2 #5 10x2       Standing mini squats w t-ball       10x2, 5\" ea       Standing marching       #5 10x3       HS culrs       #5 10x2       Leg press b LE self stretch   L4 10x15\" L4 10x15\"     L4 15x20\" ea L4 20x20\" ea    Step ups FF    10\" 10x2 R UE hold    10\" 10x2 R UE use 10\" 10x2 5\" R UE use 10\" 10x3 R UE hold " "   Step ups lat    10\" 10x2 B UE hold    10\" 10x2 R UE use      Step over step        6\" L LE on the step, u/l UE hold 10x2      FF step self stretch    5x15\"  5x15\"    5x20\" 5x30\"    Deep squat self stretch    B UE hold 5x10\" B UE hold 5x15\"   B UE hold 5x20\" ea B UE hold 5x30\" ea B UE hold 5x30\" ea                                              Ther Activity              HEP given/reviewed 5' review             Edema control 5'             Gait Training              SPC use on level surface/posture              Open floor marching, SPC use   8' no cane/posture/speed     5' speed, no AD 5' speed 150'x2 5' [id number] x2    Step ups ant              Stairs negotiation    2 flights step over B UE hold 2 flights step over, B UE hold   2 flights w u/l UE use, step over step/speed 2 flights w u/l UE hold/speed/posture step over 2 flights w u/l UE hold/speed/posture step over step    Modalities              CP L knee                                                                       "

## 2025-04-18 ENCOUNTER — OFFICE VISIT (OUTPATIENT)
Dept: PHYSICAL THERAPY | Facility: CLINIC | Age: 70
End: 2025-04-18
Payer: MEDICARE

## 2025-04-18 DIAGNOSIS — Z96.652 S/P TKR (TOTAL KNEE REPLACEMENT), LEFT: Primary | ICD-10-CM

## 2025-04-18 DIAGNOSIS — Z96.652 PRESENCE OF ARTIFICIAL KNEE JOINT, LEFT: ICD-10-CM

## 2025-04-18 DIAGNOSIS — M25.562 ACUTE PAIN OF LEFT KNEE: ICD-10-CM

## 2025-04-18 PROCEDURE — 97116 GAIT TRAINING THERAPY: CPT | Performed by: PHYSICAL THERAPIST

## 2025-04-18 PROCEDURE — 97110 THERAPEUTIC EXERCISES: CPT | Performed by: PHYSICAL THERAPIST

## 2025-04-18 NOTE — PROGRESS NOTES
"Daily Note     Today's date: 2025  Patient name: Deidra Brower  : 1955  MRN: [id number]  Referring provider: Boo Box MD  Dx:   Encounter Diagnosis     ICD-10-CM    1. S/P TKR (total knee replacement), left  Z96.652       2. Presence of artificial knee joint, left  Z96.652       3. Acute pain of left knee  M25.562                      Subjective: No new changes since last tx session. Amb without AD.      Objective: See treatment diary below  Met all goals.  1. Independent and safe with HEP.  2. Independent and safe with amb on all surfaces without AD.  3. Independent with self stretching tech to max L knee AROM at least 115 degrees of flexion.  4. L knee MMT at lest 4+/5 t/o to max safe standing ADL's post D/C.    Assessment: Tolerated treatment well. Patient exhibited good technique with therapeutic exercises and HEP tech. Max potential reached at this time. Safe and fully independent with functional mobility, gait on all surfaces, L LE flexibility and strength.      Plan: D/C PT with HEP review.     Precautions: s/p L TKR ()      Manuals 3/17 3/21 3/24 3/26 3/28 4/2 4/4 4/8 4/11 4/15 4/18   PROM/stretching of L knee SZ SZ SZ SZ SZ SZ SZ  self self self   Scar mobs L knee                                                        L pat mobs              ROM L knee flexion 95/105 95/100 100/110 100/110 (+) 105/1110 105/110 105/110 115/120 120 120 120, MMT 4+/5 quad, 5/5 HS   Reassessment      SZ        Neuro Re-Ed                                          Rec. Bike/ROM/posture 10' 10' 10' 10' 10' Upright L1 10' L2 10' full FF L2 10' full L3 10' full 10' full 10' full                                                           Ther Ex              Supine heel slides w strap  15x15\" 15x20\"  #4 10x20\" #5 10x2 #5 10x2       Supine bridge T-ball 10x3, 5\"      10x2, 8\" ea       Supine SLR  10x3    #5 10x2 #5 10x3       Sit to stand      No UE use 10x2 No UE use 10x2 No UE 10x3 No UE 10x3     Sitting " "TKE/HS curls      #5 10x2 #5 10x2       Standing mini squats w t-ball       10x2, 5\" ea       Standing marching       #5 10x3       HS culrs       #5 10x2       Leg press b LE self stretch   L4 10x15\" L4 10x15\"     L4 15x20\" ea L4 20x20\" ea L4 10x20\" ea   Step ups FF    10\" 10x2 R UE hold    10\" 10x2 R UE use 10\" 10x2 5\" R UE use 10\" 10x3 R UE hold 10\" 10x3 R UE hold   Step ups lat    10\" 10x2 B UE hold    10\" 10x2 R UE use      Step over step        6\" L LE on the step, u/l UE hold 10x2      FF step self stretch    5x15\"  5x15\"    5x20\" 5x30\" 5x30\"   Deep squat self stretch    B UE hold 5x10\" B UE hold 5x15\"   B UE hold 5x20\" ea B UE hold 5x30\" ea B UE hold 5x30\" ea B UE hold 5x30\" ea                                             Ther Activity              HEP given/reviewed 5' review             Edema control 5'             Gait Training              SPC use on level surface/posture              Open floor marching, SPC use   8' no cane/posture/speed     5' speed, no AD 5' speed 150'x2 5' [id number] x2 Outdoor amb on grass/incline/uneven surface 8'   Step ups ant              Stairs negotiation    2 flights step over B UE hold 2 flights step over, B UE hold   2 flights w u/l UE use, step over step/speed 2 flights w u/l UE hold/speed/posture step over 2 flights w u/l UE hold/speed/posture step over step    Modalities              CP L knee                                                                         "

## 2025-07-22 ENCOUNTER — EVALUATION (OUTPATIENT)
Dept: PHYSICAL THERAPY | Facility: CLINIC | Age: 70
End: 2025-07-22
Payer: MEDICARE

## 2025-07-22 DIAGNOSIS — M72.2 PLANTAR FASCIITIS OF RIGHT FOOT: Primary | ICD-10-CM

## 2025-07-22 PROCEDURE — 97161 PT EVAL LOW COMPLEX 20 MIN: CPT | Performed by: PHYSICAL THERAPIST

## 2025-07-22 PROCEDURE — 97110 THERAPEUTIC EXERCISES: CPT | Performed by: PHYSICAL THERAPIST

## 2025-07-22 NOTE — LETTER
2025    Marty Ley DPM  [address] [facility] [address]    Patient: Deidra Brower   YOB: 1955   Date of Visit: 2025     Encounter Diagnosis     ICD-10-CM    1. Plantar fasciitis of right foot  M72.2           Dear Dr. Marty Ley DPM:    Thank you for your recent referral of Deidra Brower. Please review the attached evaluation summary from Deidra's recent visit.     Please verify that you agree with the plan of care by signing the attached order.     If you have any questions or concerns, please do not hesitate to call.     I sincerely appreciate the opportunity to share in the care of one of your patients and hope to have another opportunity to work with you in the near future.       Sincerely,    Chinyere Grissom, PT      Referring Provider:      I certify that I have read the below Plan of Care and certify the need for these services furnished under this plan of treatment while under my care.                    Marty Ley DPM  [address] [facility] [address]  Via Fax: [phone]          PT Evaluation     Today's date: 2025  Patient name: Deidra Brower  : 1955  MRN: [id number]  Referring provider: Marty Ley DPM  Dx:   Encounter Diagnosis     ICD-10-CM    1. Plantar fasciitis of right foot  M72.2                      Assessment    Assessment details: Angelic Brower is a 71 yo female with complaint of right lateral heel pain presenting with decreased calf strength/flexibility, abnormal neural tension of sciatic nerve with sural bias, and decreased flexibility of piriformis muscle. She is an excellent candidate for OPPT to address the above impairments and optimize functional status.     Goals  4-6 weeks  1. Independent with HEP at discharge  2. Normalize neural tension of sciatic nerve with sural bias to promote return to PLOF  3. Normalize right calf strength and flexibility to promote return to PLOF  4. Normalize right piriformis  flexibility to reduce risk of recurrence    Plan  Patient would benefit from: PT eval and skilled physical therapy  Planned modality interventions: low level laser therapy    Planned therapy interventions: IASTM, manual therapy, neuromuscular re-education, patient/caregiver education, therapeutic activities, therapeutic exercise and home exercise program    Frequency: 2x week  Duration in weeks: 6  Treatment plan discussed with: patient  Plan details: Provided with and reviewed initial written HEP.  Reviewed physical exam findings and plan of care.  All questions answered to patient's satisfaction.          Subjective Evaluation    History of Present Illness  Mechanism of injury: Patient has been having right lateral heel pain for the last several months with no mechanism of injury. Pain will occur randomly; sometimes walking, sometimes sitting still. Occurs about 6-7 times per day. She saw podiatry and was referred to OPPT to treat plantar fasciitis but per patient report, podiatrist feels it may be sural nerve entrapment. Took a 5 day course of prednisone with little to no relief.     Of note, history of right sided sciatica.   Patient Goals  Patient goals for therapy: decreased pain    Pain  Current pain ratin  At worst pain ratin  Location: R lateral heel    Exercise history: walking in the pool 30 minutes per day      Diagnostic Tests  No diagnostic tests performed  Treatments  Previous treatment: medication        Objective     Active Range of Motion     Right Ankle/Foot   Dorsiflexion (ke): 5 degrees   Plantar flexion: WFL  Inversion: WFL  Eversion: WFL  Great toe extension: 50 degrees     Strength/Myotome Testing     Right Ankle/Foot   Dorsiflexion: 5  Plantar flexion: 4  Inversion: 5  Eversion: 5    Tests     Lumbar     Right   Positive sural bias and slump test.     Right Ankle/Foot   Negative for windlass.              Precautions: h/o DVT and PE 2018 (on simon)      Manuals             [facility]  lateral heel and calf KT IASTM/STM                                                   Neuro Re-Ed                                                                                                        Ther Ex             Sciatic nerve flossing             Sural nerve flossing             Heel raises             Std calf stretch             Piriformis str                                       Pt ed HEP rev KT            Ther Activity                                       Gait Training                                       Modalities

## 2025-07-22 NOTE — PROGRESS NOTES
PT Evaluation     Today's date: 2025  Patient name: Deidra Brower  : 1955  MRN: [id number]  Referring provider: Marty Ley DPM  Dx:   Encounter Diagnosis     ICD-10-CM    1. Plantar fasciitis of right foot  M72.2                      Assessment    Assessment details: Angelic Brower is a 69 yo female with complaint of right lateral heel pain presenting with decreased calf strength/flexibility, abnormal neural tension of sciatic nerve with sural bias, and decreased flexibility of piriformis muscle. She is an excellent candidate for OPPT to address the above impairments and optimize functional status.     Goals  4-6 weeks  1. Independent with HEP at discharge  2. Normalize neural tension of sciatic nerve with sural bias to promote return to PLOF  3. Normalize right calf strength and flexibility to promote return to PLOF  4. Normalize right piriformis flexibility to reduce risk of recurrence    Plan  Patient would benefit from: PT eval and skilled physical therapy  Planned modality interventions: low level laser therapy    Planned therapy interventions: IASTM, manual therapy, neuromuscular re-education, patient/caregiver education, therapeutic activities, therapeutic exercise and home exercise program    Frequency: 2x week  Duration in weeks: 6  Treatment plan discussed with: patient  Plan details: Provided with and reviewed initial written HEP.  Reviewed physical exam findings and plan of care.  All questions answered to patient's satisfaction.          Subjective Evaluation    History of Present Illness  Mechanism of injury: Patient has been having right lateral heel pain for the last several months with no mechanism of injury. Pain will occur randomly; sometimes walking, sometimes sitting still. Occurs about 6-7 times per day. She saw podiatry and was referred to OPPT to treat plantar fasciitis but per patient report, podiatrist feels it may be sural nerve entrapment. Took a 5 day course of prednisone  with little to no relief.     Of note, history of right sided sciatica.   Patient Goals  Patient goals for therapy: decreased pain    Pain  Current pain ratin  At worst pain ratin  Location: R lateral heel    Exercise history: walking in the pool 30 minutes per day      Diagnostic Tests  No diagnostic tests performed  Treatments  Previous treatment: medication        Objective     Active Range of Motion     Right Ankle/Foot   Dorsiflexion (ke): 5 degrees   Plantar flexion: WFL  Inversion: WFL  Eversion: WFL  Great toe extension: 50 degrees     Strength/Myotome Testing     Right Ankle/Foot   Dorsiflexion: 5  Plantar flexion: 4  Inversion: 5  Eversion: 5    Tests     Lumbar     Right   Positive sural bias and slump test.     Right Ankle/Foot   Negative for windlass.              Precautions: h/o DVT and PE 2018 (on elliquis)      Manuals             STM lateral heel and calf KT IASTM/STM                                                   Neuro Re-Ed                                                                                                        Ther Ex             Sciatic nerve flossing             Sural nerve flossing             Heel raises             Std calf stretch             Piriformis str                                       Pt ed HEP rev KT            Ther Activity                                       Gait Training                                       Modalities

## 2025-07-24 ENCOUNTER — OFFICE VISIT (OUTPATIENT)
Dept: PHYSICAL THERAPY | Facility: CLINIC | Age: 70
End: 2025-07-24
Payer: MEDICARE

## 2025-07-24 DIAGNOSIS — M72.2 PLANTAR FASCIITIS OF RIGHT FOOT: Primary | ICD-10-CM

## 2025-07-24 PROCEDURE — 97140 MANUAL THERAPY 1/> REGIONS: CPT | Performed by: PHYSICAL THERAPIST

## 2025-07-24 PROCEDURE — 97110 THERAPEUTIC EXERCISES: CPT | Performed by: PHYSICAL THERAPIST

## 2025-07-24 NOTE — PROGRESS NOTES
"Daily Note     Today's date: 2025  Patient name: Deidra Brower  : 1955  MRN: [id number]  Referring provider: Marty Ley DPM  Dx:   Encounter Diagnosis     ICD-10-CM    1. Plantar fasciitis of right foot  M72.2                      Subjective: Pt reports no changes. Unsure if she's doing the exercises correctly.       Objective: See treatment diary below      Assessment: Tolerated treatment well. Patient required frequent cueing for form during exercises.       Plan: Continue per plan of care.      Precautions: h/o DVT and PE 2018 (on elliquis)      Manuals            STM lateral heel and calf KT IASTM/STM KT STM                                                   Neuro Re-Ed                                                                                                        Ther Ex             Sciatic nerve flossing  10x           Sural nerve flossing  10x           Heel raises  3x10           Std calf stretch  30\"x3           Piriformis str  30\"x3           Bike warm up  6'                        Pt ed HEP rev KT            Ther Activity                                       Gait Training                                       Modalities                                            "

## 2025-07-28 ENCOUNTER — OFFICE VISIT (OUTPATIENT)
Dept: PHYSICAL THERAPY | Facility: CLINIC | Age: 70
End: 2025-07-28
Payer: MEDICARE

## 2025-07-28 DIAGNOSIS — M72.2 PLANTAR FASCIITIS OF RIGHT FOOT: Primary | ICD-10-CM

## 2025-07-28 PROCEDURE — 97110 THERAPEUTIC EXERCISES: CPT

## 2025-07-28 PROCEDURE — 97140 MANUAL THERAPY 1/> REGIONS: CPT

## 2025-07-29 PROBLEM — M17.12 OSTEOARTHRITIS OF LEFT KNEE: Status: ACTIVE | Noted: 2025-07-29

## 2025-07-29 PROBLEM — E03.9 HYPOTHYROIDISM: Status: ACTIVE | Noted: 2025-07-29

## 2025-07-29 PROBLEM — E11.65 TYPE 2 DIABETES MELLITUS WITH HYPERGLYCEMIA (HCC): Status: ACTIVE | Noted: 2025-07-29

## 2025-07-29 PROBLEM — M54.50 LOWER BACK PAIN: Status: ACTIVE | Noted: 2025-07-29

## 2025-07-29 PROBLEM — E05.00 GRAVES' DISEASE: Status: ACTIVE | Noted: 2025-07-29

## 2025-07-29 PROBLEM — L98.8 LESION OF SKIN OF BREAST: Status: ACTIVE | Noted: 2025-07-29

## 2025-07-29 PROBLEM — L73.9 FOLLICULITIS: Status: ACTIVE | Noted: 2025-07-29

## 2025-07-29 PROBLEM — M17.32 POST-TRAUMATIC OSTEOARTHRITIS OF LEFT KNEE: Status: ACTIVE | Noted: 2025-07-29

## 2025-07-29 PROBLEM — H26.9 CATARACT: Status: ACTIVE | Noted: 2025-07-29

## 2025-07-29 PROBLEM — Z96.652 STATUS POST TOTAL KNEE REPLACEMENT, LEFT: Status: ACTIVE | Noted: 2025-07-29

## 2025-07-29 PROBLEM — Z86.718 PERSONAL HISTORY OF DVT (DEEP VEIN THROMBOSIS): Status: ACTIVE | Noted: 2025-07-29

## 2025-07-29 PROBLEM — M48.062 SPINAL STENOSIS OF LUMBAR REGION WITH NEUROGENIC CLAUDICATION: Status: ACTIVE | Noted: 2025-07-29

## 2025-07-29 PROBLEM — G57.00 PIRIFORMIS SYNDROME: Status: ACTIVE | Noted: 2025-07-29

## 2025-07-29 PROBLEM — Z86.711 HISTORY OF PULMONARY EMBOLISM: Status: ACTIVE | Noted: 2025-07-29

## 2025-07-29 PROBLEM — D68.59 HYPERCOAGULABLE STATE (HCC): Status: ACTIVE | Noted: 2025-07-29

## 2025-07-31 ENCOUNTER — VBI (OUTPATIENT)
Dept: ADMINISTRATIVE | Facility: OTHER | Age: 70
End: 2025-07-31

## 2025-08-01 ENCOUNTER — OFFICE VISIT (OUTPATIENT)
Dept: PHYSICAL THERAPY | Facility: CLINIC | Age: 70
End: 2025-08-01
Payer: MEDICARE

## 2025-08-01 DIAGNOSIS — M72.2 PLANTAR FASCIITIS OF RIGHT FOOT: Primary | ICD-10-CM

## 2025-08-01 PROCEDURE — 97140 MANUAL THERAPY 1/> REGIONS: CPT | Performed by: PHYSICAL THERAPIST

## 2025-08-01 PROCEDURE — 97110 THERAPEUTIC EXERCISES: CPT | Performed by: PHYSICAL THERAPIST

## 2025-08-04 ENCOUNTER — OFFICE VISIT (OUTPATIENT)
Dept: PHYSICAL THERAPY | Facility: CLINIC | Age: 70
End: 2025-08-04
Payer: MEDICARE

## 2025-08-04 DIAGNOSIS — M72.2 PLANTAR FASCIITIS OF RIGHT FOOT: Primary | ICD-10-CM

## 2025-08-04 PROCEDURE — 97140 MANUAL THERAPY 1/> REGIONS: CPT | Performed by: PHYSICAL THERAPIST

## 2025-08-04 PROCEDURE — 97110 THERAPEUTIC EXERCISES: CPT | Performed by: PHYSICAL THERAPIST

## 2025-08-05 ENCOUNTER — OFFICE VISIT (OUTPATIENT)
Age: 70
End: 2025-08-05
Payer: MEDICARE

## 2025-08-05 VITALS
BODY MASS INDEX: 42.88 KG/M2 | HEART RATE: 76 BPM | OXYGEN SATURATION: 96 % | DIASTOLIC BLOOD PRESSURE: 56 MMHG | WEIGHT: 233 LBS | SYSTOLIC BLOOD PRESSURE: 118 MMHG | TEMPERATURE: 98.1 F | HEIGHT: 62 IN

## 2025-08-05 DIAGNOSIS — D68.69 SECONDARY HYPERCOAGULABLE STATE (HCC): ICD-10-CM

## 2025-08-05 DIAGNOSIS — Z23 ENCOUNTER FOR IMMUNIZATION: ICD-10-CM

## 2025-08-05 DIAGNOSIS — I10 ESSENTIAL HYPERTENSION: ICD-10-CM

## 2025-08-05 DIAGNOSIS — Z86.718 PERSONAL HISTORY OF DVT (DEEP VEIN THROMBOSIS): ICD-10-CM

## 2025-08-05 DIAGNOSIS — Z00.00 ENCOUNTER FOR ANNUAL WELLNESS VISIT (AWV) IN MEDICARE PATIENT: ICD-10-CM

## 2025-08-05 DIAGNOSIS — E11.65 TYPE 2 DIABETES MELLITUS WITH HYPERGLYCEMIA, WITHOUT LONG-TERM CURRENT USE OF INSULIN (HCC): ICD-10-CM

## 2025-08-05 DIAGNOSIS — Z86.711 HISTORY OF PULMONARY EMBOLISM: ICD-10-CM

## 2025-08-05 DIAGNOSIS — Z12.31 ENCOUNTER FOR SCREENING MAMMOGRAM FOR BREAST CANCER: Primary | ICD-10-CM

## 2025-08-05 DIAGNOSIS — G47.33 OSA ON CPAP: ICD-10-CM

## 2025-08-05 DIAGNOSIS — Z86.39 HISTORY OF GRAVES' DISEASE: ICD-10-CM

## 2025-08-05 DIAGNOSIS — E03.9 ACQUIRED HYPOTHYROIDISM: ICD-10-CM

## 2025-08-05 DIAGNOSIS — E66.01 MORBID OBESITY WITH BMI OF 40.0-44.9, ADULT (HCC): ICD-10-CM

## 2025-08-05 PROBLEM — Z96.652 STATUS POST TOTAL KNEE REPLACEMENT, LEFT: Status: RESOLVED | Noted: 2025-07-29 | Resolved: 2025-08-05

## 2025-08-05 PROBLEM — Z79.01 CURRENT USE OF LONG TERM ANTICOAGULATION: Status: RESOLVED | Noted: 2021-06-28 | Resolved: 2025-08-05

## 2025-08-05 PROBLEM — E66.9 TYPE 2 DIABETES MELLITUS WITH OBESITY  (HCC): Status: RESOLVED | Noted: 2019-01-08 | Resolved: 2025-08-05

## 2025-08-05 PROBLEM — L98.8 LESION OF SKIN OF BREAST: Status: RESOLVED | Noted: 2025-07-29 | Resolved: 2025-08-05

## 2025-08-05 PROBLEM — IMO0002 SLEEP-RELATED HYPOVENTILATION: Status: RESOLVED | Noted: 2019-01-08 | Resolved: 2025-08-05

## 2025-08-05 PROBLEM — M17.32 POST-TRAUMATIC OSTEOARTHRITIS OF LEFT KNEE: Status: RESOLVED | Noted: 2025-07-29 | Resolved: 2025-08-05

## 2025-08-05 PROBLEM — L73.9 FOLLICULITIS: Status: RESOLVED | Noted: 2025-07-29 | Resolved: 2025-08-05

## 2025-08-05 PROBLEM — I82.4Z9 ACUTE DEEP VEIN THROMBOSIS (DVT) OF DISTAL VEIN OF LOWER EXTREMITY (HCC): Status: RESOLVED | Noted: 2021-06-28 | Resolved: 2025-08-05

## 2025-08-05 PROBLEM — M54.50 LOWER BACK PAIN: Status: RESOLVED | Noted: 2025-07-29 | Resolved: 2025-08-05

## 2025-08-05 PROBLEM — M17.12 OSTEOARTHRITIS OF LEFT KNEE: Status: RESOLVED | Noted: 2025-07-29 | Resolved: 2025-08-05

## 2025-08-05 PROBLEM — H26.9 CATARACT: Status: RESOLVED | Noted: 2025-07-29 | Resolved: 2025-08-05

## 2025-08-05 PROBLEM — E11.69 TYPE 2 DIABETES MELLITUS WITH OBESITY  (HCC): Status: RESOLVED | Noted: 2019-01-08 | Resolved: 2025-08-05

## 2025-08-05 PROBLEM — R68.2 DRY MOUTH: Status: RESOLVED | Noted: 2019-01-08 | Resolved: 2025-08-05

## 2025-08-05 PROBLEM — I26.99 OTHER PULMONARY EMBOLISM WITHOUT ACUTE COR PULMONALE (HCC): Status: RESOLVED | Noted: 2021-06-28 | Resolved: 2025-08-05

## 2025-08-05 PROCEDURE — 90677 PCV20 VACCINE IM: CPT

## 2025-08-05 PROCEDURE — G2211 COMPLEX E/M VISIT ADD ON: HCPCS | Performed by: FAMILY MEDICINE

## 2025-08-05 PROCEDURE — G0439 PPPS, SUBSEQ VISIT: HCPCS | Performed by: FAMILY MEDICINE

## 2025-08-05 PROCEDURE — G0009 ADMIN PNEUMOCOCCAL VACCINE: HCPCS

## 2025-08-05 PROCEDURE — 99214 OFFICE O/P EST MOD 30 MIN: CPT | Performed by: FAMILY MEDICINE

## 2025-08-05 RX ORDER — SIMVASTATIN 10 MG
10 TABLET ORAL
Qty: 90 TABLET | Refills: 3 | Status: SHIPPED | OUTPATIENT
Start: 2025-08-05

## 2025-08-05 RX ORDER — SEMAGLUTIDE 0.68 MG/ML
0.5 INJECTION, SOLUTION SUBCUTANEOUS
Qty: 9 ML | Refills: 3 | Status: SHIPPED | OUTPATIENT
Start: 2025-08-05

## 2025-08-05 RX ORDER — LISINOPRIL 10 MG/1
10 TABLET ORAL DAILY
Qty: 90 TABLET | Refills: 3 | Status: SHIPPED | OUTPATIENT
Start: 2025-08-05

## 2025-08-05 RX ORDER — LEVOTHYROXINE SODIUM 125 UG/1
125 TABLET ORAL DAILY
Qty: 90 TABLET | Refills: 3 | Status: SHIPPED | OUTPATIENT
Start: 2025-08-05

## 2025-08-05 RX ORDER — MULTIVITAMIN
1 TABLET ORAL DAILY
COMMUNITY

## 2025-08-08 ENCOUNTER — OFFICE VISIT (OUTPATIENT)
Dept: PHYSICAL THERAPY | Facility: CLINIC | Age: 70
End: 2025-08-08
Payer: MEDICARE

## 2025-08-08 DIAGNOSIS — M72.2 PLANTAR FASCIITIS OF RIGHT FOOT: Primary | ICD-10-CM

## 2025-08-08 PROCEDURE — 97110 THERAPEUTIC EXERCISES: CPT | Performed by: PHYSICAL THERAPIST

## 2025-08-08 PROCEDURE — 97140 MANUAL THERAPY 1/> REGIONS: CPT | Performed by: PHYSICAL THERAPIST

## 2025-08-14 ENCOUNTER — OFFICE VISIT (OUTPATIENT)
Dept: PHYSICAL THERAPY | Facility: CLINIC | Age: 70
End: 2025-08-14
Payer: MEDICARE